# Patient Record
Sex: MALE | Race: WHITE | NOT HISPANIC OR LATINO | Employment: STUDENT | URBAN - NONMETROPOLITAN AREA
[De-identification: names, ages, dates, MRNs, and addresses within clinical notes are randomized per-mention and may not be internally consistent; named-entity substitution may affect disease eponyms.]

---

## 2020-07-04 ENCOUNTER — HOSPITAL ENCOUNTER (INPATIENT)
Facility: HOSPITAL | Age: 20
LOS: 1 days | Discharge: HOME | DRG: 351 | End: 2020-07-06
Attending: EMERGENCY MEDICINE | Admitting: FAMILY MEDICINE
Payer: COMMERCIAL

## 2020-07-04 ENCOUNTER — APPOINTMENT (EMERGENCY)
Dept: CT IMAGING | Facility: HOSPITAL | Age: 20
DRG: 351 | End: 2020-07-04
Payer: COMMERCIAL

## 2020-07-04 ENCOUNTER — APPOINTMENT (EMERGENCY)
Dept: RADIOLOGY | Facility: HOSPITAL | Age: 20
DRG: 351 | End: 2020-07-04
Payer: COMMERCIAL

## 2020-07-04 DIAGNOSIS — R40.4 TRANSIENT ALTERATION OF AWARENESS: ICD-10-CM

## 2020-07-04 DIAGNOSIS — N17.9 AKI (ACUTE KIDNEY INJURY) (HCC): Primary | ICD-10-CM

## 2020-07-04 DIAGNOSIS — F23 ACUTE PSYCHOSIS (HCC): ICD-10-CM

## 2020-07-04 PROBLEM — R74.8 ELEVATED CK: Status: ACTIVE | Noted: 2020-07-04

## 2020-07-04 PROBLEM — E87.2 LACTIC ACIDOSIS: Status: ACTIVE | Noted: 2020-07-04

## 2020-07-04 PROBLEM — S01.21XA LACERATION OF NOSE: Status: ACTIVE | Noted: 2020-07-04

## 2020-07-04 PROBLEM — E87.20 LACTIC ACIDOSIS: Status: ACTIVE | Noted: 2020-07-04

## 2020-07-04 LAB
ALBUMIN SERPL BCP-MCNC: 4.1 G/DL (ref 3.5–5)
ALP SERPL-CCNC: 51 U/L (ref 46–116)
ALT SERPL W P-5'-P-CCNC: 52 U/L (ref 12–78)
AMPHETAMINES SERPL QL SCN: NEGATIVE
ANION GAP SERPL CALCULATED.3IONS-SCNC: 12 MMOL/L (ref 4–13)
AST SERPL W P-5'-P-CCNC: 66 U/L (ref 5–45)
BARBITURATES UR QL: NEGATIVE
BASOPHILS # BLD AUTO: 0.04 THOUSANDS/ΜL (ref 0–0.1)
BASOPHILS NFR BLD AUTO: 1 % (ref 0–1)
BENZODIAZ UR QL: NEGATIVE
BILIRUB SERPL-MCNC: 0.87 MG/DL (ref 0.2–1)
BUN SERPL-MCNC: 18 MG/DL (ref 5–25)
CALCIUM SERPL-MCNC: 9.3 MG/DL (ref 8.3–10.1)
CHLORIDE SERPL-SCNC: 106 MMOL/L (ref 100–108)
CK MB SERPL-MCNC: 1.9 NG/ML (ref 0–5)
CK MB SERPL-MCNC: <1 % (ref 0–2.5)
CK SERPL-CCNC: 921 U/L (ref 39–308)
CO2 SERPL-SCNC: 25 MMOL/L (ref 21–32)
COCAINE UR QL: NEGATIVE
CREAT SERPL-MCNC: 1.8 MG/DL (ref 0.6–1.3)
EOSINOPHIL # BLD AUTO: 0.05 THOUSAND/ΜL (ref 0–0.61)
EOSINOPHIL NFR BLD AUTO: 1 % (ref 0–6)
ERYTHROCYTE [DISTWIDTH] IN BLOOD BY AUTOMATED COUNT: 11.9 % (ref 11.6–15.1)
ETHANOL SERPL-MCNC: <3 MG/DL (ref 0–3)
GFR SERPL CREATININE-BSD FRML MDRD: 53 ML/MIN/1.73SQ M
GLUCOSE SERPL-MCNC: 103 MG/DL (ref 65–140)
HCT VFR BLD AUTO: 41.2 % (ref 36.5–49.3)
HGB BLD-MCNC: 14.1 G/DL (ref 12–17)
IMM GRANULOCYTES # BLD AUTO: 0.03 THOUSAND/UL (ref 0–0.2)
IMM GRANULOCYTES NFR BLD AUTO: 0 % (ref 0–2)
LACTATE SERPL-SCNC: 3 MMOL/L (ref 0.5–2)
LIPASE SERPL-CCNC: 205 U/L (ref 73–393)
LYMPHOCYTES # BLD AUTO: 0.64 THOUSANDS/ΜL (ref 0.6–4.47)
LYMPHOCYTES NFR BLD AUTO: 8 % (ref 14–44)
MAGNESIUM SERPL-MCNC: 2 MG/DL (ref 1.6–2.6)
MCH RBC QN AUTO: 29.3 PG (ref 26.8–34.3)
MCHC RBC AUTO-ENTMCNC: 34.2 G/DL (ref 31.4–37.4)
MCV RBC AUTO: 86 FL (ref 82–98)
METHADONE UR QL: NEGATIVE
MONOCYTES # BLD AUTO: 0.81 THOUSAND/ΜL (ref 0.17–1.22)
MONOCYTES NFR BLD AUTO: 10 % (ref 4–12)
NEUTROPHILS # BLD AUTO: 6.39 THOUSANDS/ΜL (ref 1.85–7.62)
NEUTS SEG NFR BLD AUTO: 80 % (ref 43–75)
NRBC BLD AUTO-RTO: 0 /100 WBCS
OPIATES UR QL SCN: NEGATIVE
OXYCODONE+OXYMORPHONE UR QL SCN: NEGATIVE
PCP UR QL: NEGATIVE
PLATELET # BLD AUTO: 294 THOUSANDS/UL (ref 149–390)
PMV BLD AUTO: 9.5 FL (ref 8.9–12.7)
POTASSIUM SERPL-SCNC: 3.6 MMOL/L (ref 3.5–5.3)
PROT SERPL-MCNC: 7.7 G/DL (ref 6.4–8.2)
RBC # BLD AUTO: 4.81 MILLION/UL (ref 3.88–5.62)
SODIUM SERPL-SCNC: 143 MMOL/L (ref 136–145)
THC UR QL: NEGATIVE
WBC # BLD AUTO: 7.96 THOUSAND/UL (ref 4.31–10.16)

## 2020-07-04 PROCEDURE — 71045 X-RAY EXAM CHEST 1 VIEW: CPT

## 2020-07-04 PROCEDURE — 12011 RPR F/E/E/N/L/M 2.5 CM/<: CPT | Performed by: EMERGENCY MEDICINE

## 2020-07-04 PROCEDURE — 09QKXZZ REPAIR NASAL MUCOSA AND SOFT TISSUE, EXTERNAL APPROACH: ICD-10-PCS | Performed by: EMERGENCY MEDICINE

## 2020-07-04 PROCEDURE — 80307 DRUG TEST PRSMV CHEM ANLYZR: CPT | Performed by: EMERGENCY MEDICINE

## 2020-07-04 PROCEDURE — 85025 COMPLETE CBC W/AUTO DIFF WBC: CPT | Performed by: EMERGENCY MEDICINE

## 2020-07-04 PROCEDURE — 83690 ASSAY OF LIPASE: CPT | Performed by: EMERGENCY MEDICINE

## 2020-07-04 PROCEDURE — 82550 ASSAY OF CK (CPK): CPT | Performed by: EMERGENCY MEDICINE

## 2020-07-04 PROCEDURE — 96360 HYDRATION IV INFUSION INIT: CPT

## 2020-07-04 PROCEDURE — 99285 EMERGENCY DEPT VISIT HI MDM: CPT

## 2020-07-04 PROCEDURE — 36415 COLL VENOUS BLD VENIPUNCTURE: CPT | Performed by: EMERGENCY MEDICINE

## 2020-07-04 PROCEDURE — 83605 ASSAY OF LACTIC ACID: CPT | Performed by: EMERGENCY MEDICINE

## 2020-07-04 PROCEDURE — 80053 COMPREHEN METABOLIC PANEL: CPT | Performed by: EMERGENCY MEDICINE

## 2020-07-04 PROCEDURE — 70450 CT HEAD/BRAIN W/O DYE: CPT

## 2020-07-04 PROCEDURE — 80320 DRUG SCREEN QUANTALCOHOLS: CPT | Performed by: EMERGENCY MEDICINE

## 2020-07-04 PROCEDURE — 99220 PR INITIAL OBSERVATION CARE/DAY 70 MINUTES: CPT | Performed by: NURSE PRACTITIONER

## 2020-07-04 PROCEDURE — 72125 CT NECK SPINE W/O DYE: CPT

## 2020-07-04 PROCEDURE — 99285 EMERGENCY DEPT VISIT HI MDM: CPT | Performed by: EMERGENCY MEDICINE

## 2020-07-04 PROCEDURE — 93005 ELECTROCARDIOGRAM TRACING: CPT

## 2020-07-04 PROCEDURE — 96361 HYDRATE IV INFUSION ADD-ON: CPT

## 2020-07-04 PROCEDURE — 83735 ASSAY OF MAGNESIUM: CPT | Performed by: EMERGENCY MEDICINE

## 2020-07-04 PROCEDURE — 82553 CREATINE MB FRACTION: CPT | Performed by: EMERGENCY MEDICINE

## 2020-07-04 RX ORDER — SODIUM CHLORIDE 9 MG/ML
125 INJECTION, SOLUTION INTRAVENOUS CONTINUOUS
Status: DISCONTINUED | OUTPATIENT
Start: 2020-07-04 | End: 2020-07-06

## 2020-07-04 RX ADMIN — SODIUM CHLORIDE 125 ML/HR: 0.9 INJECTION, SOLUTION INTRAVENOUS at 23:49

## 2020-07-04 RX ADMIN — SODIUM CHLORIDE 1000 ML: 0.9 INJECTION, SOLUTION INTRAVENOUS at 21:33

## 2020-07-05 PROBLEM — F23 ACUTE PSYCHOSIS (HCC): Status: ACTIVE | Noted: 2020-07-04

## 2020-07-05 PROBLEM — T79.6XXA TRAUMATIC RHABDOMYOLYSIS (HCC): Status: ACTIVE | Noted: 2020-07-04

## 2020-07-05 LAB
ANION GAP SERPL CALCULATED.3IONS-SCNC: 10 MMOL/L (ref 4–13)
ANION GAP SERPL CALCULATED.3IONS-SCNC: 11 MMOL/L (ref 4–13)
ANION GAP SERPL CALCULATED.3IONS-SCNC: 8 MMOL/L (ref 4–13)
BACTERIA UR QL AUTO: ABNORMAL /HPF
BASOPHILS # BLD AUTO: 0.02 THOUSANDS/ΜL (ref 0–0.1)
BASOPHILS NFR BLD AUTO: 0 % (ref 0–1)
BILIRUB UR QL STRIP: ABNORMAL
BUN SERPL-MCNC: 10 MG/DL (ref 5–25)
BUN SERPL-MCNC: 11 MG/DL (ref 5–25)
BUN SERPL-MCNC: 8 MG/DL (ref 5–25)
CALCIUM SERPL-MCNC: 5.1 MG/DL (ref 8.3–10.1)
CALCIUM SERPL-MCNC: 6.3 MG/DL (ref 8.3–10.1)
CALCIUM SERPL-MCNC: 8.3 MG/DL (ref 8.3–10.1)
CAOX CRY URNS QL MICRO: ABNORMAL /HPF
CHLORIDE SERPL-SCNC: 106 MMOL/L (ref 100–108)
CHLORIDE SERPL-SCNC: 113 MMOL/L (ref 100–108)
CHLORIDE SERPL-SCNC: 117 MMOL/L (ref 100–108)
CK MB SERPL-MCNC: 0.9 NG/ML (ref 0–5)
CK MB SERPL-MCNC: 1.2 NG/ML (ref 0–5)
CK MB SERPL-MCNC: <1 % (ref 0–2.5)
CK MB SERPL-MCNC: <1 % (ref 0–2.5)
CK SERPL-CCNC: 455 U/L (ref 39–308)
CK SERPL-CCNC: 578 U/L (ref 39–308)
CLARITY UR: ABNORMAL
CO2 SERPL-SCNC: 19 MMOL/L (ref 21–32)
CO2 SERPL-SCNC: 22 MMOL/L (ref 21–32)
CO2 SERPL-SCNC: 26 MMOL/L (ref 21–32)
COLOR UR: YELLOW
CREAT SERPL-MCNC: 0.76 MG/DL (ref 0.6–1.3)
CREAT SERPL-MCNC: 1.07 MG/DL (ref 0.6–1.3)
CREAT SERPL-MCNC: 1.38 MG/DL (ref 0.6–1.3)
EOSINOPHIL # BLD AUTO: 0.14 THOUSAND/ΜL (ref 0–0.61)
EOSINOPHIL NFR BLD AUTO: 3 % (ref 0–6)
ERYTHROCYTE [DISTWIDTH] IN BLOOD BY AUTOMATED COUNT: 12.1 % (ref 11.6–15.1)
GFR SERPL CREATININE-BSD FRML MDRD: 130 ML/MIN/1.73SQ M
GFR SERPL CREATININE-BSD FRML MDRD: 73 ML/MIN/1.73SQ M
GFR SERPL CREATININE-BSD FRML MDRD: 99 ML/MIN/1.73SQ M
GLUCOSE P FAST SERPL-MCNC: 88 MG/DL (ref 65–99)
GLUCOSE SERPL-MCNC: 106 MG/DL (ref 65–140)
GLUCOSE SERPL-MCNC: 88 MG/DL (ref 65–140)
GLUCOSE SERPL-MCNC: 95 MG/DL (ref 65–140)
GLUCOSE UR STRIP-MCNC: NEGATIVE MG/DL
HCT VFR BLD AUTO: 30.3 % (ref 36.5–49.3)
HGB BLD-MCNC: 10.1 G/DL (ref 12–17)
HGB UR QL STRIP.AUTO: NEGATIVE
IMM GRANULOCYTES # BLD AUTO: 0.01 THOUSAND/UL (ref 0–0.2)
IMM GRANULOCYTES NFR BLD AUTO: 0 % (ref 0–2)
KETONES UR STRIP-MCNC: ABNORMAL MG/DL
LACTATE SERPL-SCNC: 0.6 MMOL/L (ref 0.5–2)
LEUKOCYTE ESTERASE UR QL STRIP: NEGATIVE
LYMPHOCYTES # BLD AUTO: 0.87 THOUSANDS/ΜL (ref 0.6–4.47)
LYMPHOCYTES NFR BLD AUTO: 18 % (ref 14–44)
MCH RBC QN AUTO: 29.5 PG (ref 26.8–34.3)
MCHC RBC AUTO-ENTMCNC: 33.3 G/DL (ref 31.4–37.4)
MCV RBC AUTO: 89 FL (ref 82–98)
MONOCYTES # BLD AUTO: 0.5 THOUSAND/ΜL (ref 0.17–1.22)
MONOCYTES NFR BLD AUTO: 11 % (ref 4–12)
NEUTROPHILS # BLD AUTO: 3.23 THOUSANDS/ΜL (ref 1.85–7.62)
NEUTS SEG NFR BLD AUTO: 68 % (ref 43–75)
NITRITE UR QL STRIP: NEGATIVE
NON-SQ EPI CELLS URNS QL MICRO: ABNORMAL /HPF
NRBC BLD AUTO-RTO: 0 /100 WBCS
PH UR STRIP.AUTO: 6.5 [PH]
PLATELET # BLD AUTO: 195 THOUSANDS/UL (ref 149–390)
PMV BLD AUTO: 9.4 FL (ref 8.9–12.7)
POTASSIUM SERPL-SCNC: 2.3 MMOL/L (ref 3.5–5.3)
POTASSIUM SERPL-SCNC: 2.8 MMOL/L (ref 3.5–5.3)
POTASSIUM SERPL-SCNC: 4 MMOL/L (ref 3.5–5.3)
PROT UR STRIP-MCNC: ABNORMAL MG/DL
RBC # BLD AUTO: 3.42 MILLION/UL (ref 3.88–5.62)
RBC #/AREA URNS AUTO: ABNORMAL /HPF
SODIUM SERPL-SCNC: 140 MMOL/L (ref 136–145)
SODIUM SERPL-SCNC: 145 MMOL/L (ref 136–145)
SODIUM SERPL-SCNC: 147 MMOL/L (ref 136–145)
SP GR UR STRIP.AUTO: >=1.03 (ref 1–1.03)
TSH SERPL DL<=0.05 MIU/L-ACNC: 3.84 UIU/ML (ref 0.36–3.74)
UROBILINOGEN UR QL STRIP.AUTO: 1 E.U./DL
WBC # BLD AUTO: 4.77 THOUSAND/UL (ref 4.31–10.16)
WBC #/AREA URNS AUTO: ABNORMAL /HPF

## 2020-07-05 PROCEDURE — G0425 INPT/ED TELECONSULT30: HCPCS | Performed by: PSYCHIATRY & NEUROLOGY

## 2020-07-05 PROCEDURE — 82550 ASSAY OF CK (CPK): CPT | Performed by: NURSE PRACTITIONER

## 2020-07-05 PROCEDURE — 84443 ASSAY THYROID STIM HORMONE: CPT | Performed by: NURSE PRACTITIONER

## 2020-07-05 PROCEDURE — 82553 CREATINE MB FRACTION: CPT | Performed by: FAMILY MEDICINE

## 2020-07-05 PROCEDURE — 80048 BASIC METABOLIC PNL TOTAL CA: CPT | Performed by: FAMILY MEDICINE

## 2020-07-05 PROCEDURE — 85025 COMPLETE CBC W/AUTO DIFF WBC: CPT | Performed by: NURSE PRACTITIONER

## 2020-07-05 PROCEDURE — 80048 BASIC METABOLIC PNL TOTAL CA: CPT | Performed by: NURSE PRACTITIONER

## 2020-07-05 PROCEDURE — 99234 HOSP IP/OBS SM DT SF/LOW 45: CPT | Performed by: FAMILY MEDICINE

## 2020-07-05 PROCEDURE — 82550 ASSAY OF CK (CPK): CPT | Performed by: FAMILY MEDICINE

## 2020-07-05 PROCEDURE — 83605 ASSAY OF LACTIC ACID: CPT | Performed by: NURSE PRACTITIONER

## 2020-07-05 PROCEDURE — 82553 CREATINE MB FRACTION: CPT | Performed by: NURSE PRACTITIONER

## 2020-07-05 RX ORDER — POTASSIUM CHLORIDE 20 MEQ/1
40 TABLET, EXTENDED RELEASE ORAL ONCE
Status: COMPLETED | OUTPATIENT
Start: 2020-07-05 | End: 2020-07-05

## 2020-07-05 RX ORDER — POTASSIUM CHLORIDE 14.9 MG/ML
20 INJECTION INTRAVENOUS ONCE
Status: COMPLETED | OUTPATIENT
Start: 2020-07-05 | End: 2020-07-05

## 2020-07-05 RX ORDER — OLANZAPINE 10 MG/1
10 TABLET, ORALLY DISINTEGRATING ORAL ONCE
Status: COMPLETED | OUTPATIENT
Start: 2020-07-05 | End: 2020-07-05

## 2020-07-05 RX ORDER — ONDANSETRON 2 MG/ML
4 INJECTION INTRAMUSCULAR; INTRAVENOUS EVERY 6 HOURS PRN
Status: DISCONTINUED | OUTPATIENT
Start: 2020-07-05 | End: 2020-07-06 | Stop reason: HOSPADM

## 2020-07-05 RX ORDER — CALCIUM CHLORIDE 100 MG/ML
1 INJECTION INTRAVENOUS; INTRAVENTRICULAR ONCE
Status: COMPLETED | OUTPATIENT
Start: 2020-07-05 | End: 2020-07-05

## 2020-07-05 RX ORDER — LORAZEPAM 2 MG/ML
0.5 INJECTION INTRAMUSCULAR EVERY 6 HOURS PRN
Status: DISCONTINUED | OUTPATIENT
Start: 2020-07-05 | End: 2020-07-05

## 2020-07-05 RX ORDER — OLANZAPINE 2.5 MG/1
10 TABLET ORAL 2 TIMES DAILY PRN
Status: DISCONTINUED | OUTPATIENT
Start: 2020-07-05 | End: 2020-07-06 | Stop reason: HOSPADM

## 2020-07-05 RX ORDER — KETOROLAC TROMETHAMINE 30 MG/ML
15 INJECTION, SOLUTION INTRAMUSCULAR; INTRAVENOUS EVERY 6 HOURS PRN
Status: DISCONTINUED | OUTPATIENT
Start: 2020-07-05 | End: 2020-07-06 | Stop reason: HOSPADM

## 2020-07-05 RX ORDER — LORAZEPAM 2 MG/ML
2 INJECTION INTRAMUSCULAR EVERY 6 HOURS PRN
Status: DISCONTINUED | OUTPATIENT
Start: 2020-07-05 | End: 2020-07-06 | Stop reason: HOSPADM

## 2020-07-05 RX ORDER — OLANZAPINE 10 MG/1
10 INJECTION, POWDER, LYOPHILIZED, FOR SOLUTION INTRAMUSCULAR 2 TIMES DAILY PRN
Status: DISCONTINUED | OUTPATIENT
Start: 2020-07-05 | End: 2020-07-06 | Stop reason: HOSPADM

## 2020-07-05 RX ADMIN — KETOROLAC TROMETHAMINE 15 MG: 30 INJECTION, SOLUTION INTRAMUSCULAR at 15:14

## 2020-07-05 RX ADMIN — SODIUM CHLORIDE 125 ML/HR: 0.9 INJECTION, SOLUTION INTRAVENOUS at 07:22

## 2020-07-05 RX ADMIN — LORAZEPAM 0.5 MG: 2 INJECTION INTRAMUSCULAR; INTRAVENOUS at 15:45

## 2020-07-05 RX ADMIN — POTASSIUM CHLORIDE 40 MEQ: 1500 TABLET, EXTENDED RELEASE ORAL at 19:08

## 2020-07-05 RX ADMIN — SODIUM CHLORIDE 125 ML/HR: 0.9 INJECTION, SOLUTION INTRAVENOUS at 15:45

## 2020-07-05 RX ADMIN — CALCIUM CHLORIDE 1 G: 100 INJECTION, SOLUTION INTRAVENOUS; INTRAVENTRICULAR at 19:08

## 2020-07-05 RX ADMIN — OLANZAPINE 10 MG: 10 TABLET, ORALLY DISINTEGRATING ORAL at 02:15

## 2020-07-05 RX ADMIN — POTASSIUM CHLORIDE 40 MEQ: 1500 TABLET, EXTENDED RELEASE ORAL at 11:59

## 2020-07-05 RX ADMIN — POTASSIUM CHLORIDE 20 MEQ: 14.9 INJECTION, SOLUTION INTRAVENOUS at 11:59

## 2020-07-05 NOTE — ASSESSMENT & PLAN NOTE
· Patient has had occurrences of agitated behavior over past 4 months  · Yesterday he was found naked and agitated in the rafters of a barn  · He was tazed five times, and fell a distance of about two and a half feet striking his head  · CT head:  Right anterior frontal scalp soft tissue swelling without evidence for acute intracranial abnormality   · CT C-spine: No cervical spine fracture or traumatic malalignment  · CXR:  No acute cardiopulmonary process  · UDS and medical alcohol were negative    Patient is awake and alert now  He does not remember the episode that happened yesterday at all  He denies any illicit substance abuse  He has no prior psychiatric history by for last 3 months he has been having some episodes of agitation  Will ask Psychiatry for evaluation

## 2020-07-05 NOTE — QUICK NOTE
Prolonged Care Note    This note summarizes time spent with Kyler Neighbours and time spent on the unit/floor in direct care of Kyler Neighbours which is in addition to the original billed E/M service for this date  Additional care provided:  Evaluation and management of acute agitation    Start Time:  0140      Stop Time:  0229    Total Cumulative Time: 52 minutes    Destinee Melendez    Was called by nursing for patient refusing labs and crying and asking to speak with provider  Patient now hypervigilant and directly staring at staff  Patient asked if he could walk around  When encouraged to stay in bed another few hours until the morning, patient abruptly lunged toward staff  Offered patient anxiety medication to calm him down to help with sleep  He refused  He says he is not staying here  He has required several reiterations of tonight's events and why he is here  Will utilize Zyprexa Zydis 10 mg orally in attempt to avoid escalation  Will hold off on repeat lactate and neuro checks at this point, notified nursing  Asked nursing to decrease environmental stimuli around patient    Will continue to monitor

## 2020-07-05 NOTE — ED PROVIDER NOTES
History  Chief Complaint   Patient presents with    Altered Mental Status     pt  lefrt home and upon returning 30 min later he was found to have excited delirium, running naked in attic, police were called to scene, pt  was uncooperative, unwuillihng to come down from rafters, "fake" shooting at officers with his finger, pt  was tazed once in the chest and in the back for a few seconds in order to get patient under control, upon EMS arrival pt  was found more cooperative, calm, lac to bridge of nose, multiple scratches, tremors, over heated, denies SI or HI, denies drug use     Patient had an argument with his father, left the house, following this returned and seemed altered and agitated  He was running around naked in the [place] is of the home, agitated and refusing to cooperate  Police were called and were required to bring the patient under control  The police were forced to use a Taser to sedate the patient  The patient did fall approximately 2-3 feet from the rafters onto a wood floor, striking his head  Currently the patient has no complaint  He denies any drug or alcohol use at this time  He states he is not sure exactly what happened  History provided by:  Patient, police and EMS personnel   used: No    Altered Mental Status   Presenting symptoms: combativeness and confusion    Severity:  Severe  Most recent episode: Today  Episode history:  Single  Timing:  Constant  Progression:  Resolved  Chronicity:  New  Context: head injury    Context: not alcohol use and not drug use    Associated symptoms: agitation    Associated symptoms: no abdominal pain, no difficulty breathing, no eye deviation, no fever, no hallucinations, no headaches, no light-headedness, no nausea, no palpitations, no rash, no seizures, no slurred speech, no visual change, no vomiting and no weakness        None       History reviewed  No pertinent past medical history  History reviewed   No pertinent surgical history  History reviewed  No pertinent family history  I have reviewed and agree with the history as documented  E-Cigarette/Vaping    E-Cigarette Use Never User      E-Cigarette/Vaping Substances    Nicotine No     THC No     CBD No     Flavoring No      Social History     Tobacco Use    Smoking status: Never Smoker    Smokeless tobacco: Never Used   Substance Use Topics    Alcohol use: Yes     Frequency: Monthly or less     Drinks per session: 1 or 2    Drug use: Never       Review of Systems   Constitutional: Negative for chills and fever  HENT: Negative for ear pain, hearing loss, sore throat, trouble swallowing and voice change  Eyes: Negative for pain and discharge  Respiratory: Negative for cough, shortness of breath and wheezing  Cardiovascular: Negative for chest pain and palpitations  Gastrointestinal: Negative for abdominal pain, blood in stool, constipation, diarrhea, nausea and vomiting  Genitourinary: Negative for dysuria, flank pain, frequency and hematuria  Musculoskeletal: Negative for joint swelling, neck pain and neck stiffness  Skin: Negative for rash and wound  Neurological: Negative for dizziness, seizures, syncope, facial asymmetry, weakness, light-headedness and headaches  Psychiatric/Behavioral: Positive for agitation and confusion  Negative for hallucinations, self-injury and suicidal ideas  All other systems reviewed and are negative  Physical Exam  Physical Exam   Constitutional: He is oriented to person, place, and time  He appears well-developed and well-nourished  No distress  HENT:   Head: Normocephalic and atraumatic  Right Ear: External ear normal    Left Ear: External ear normal    Superficial laceration of the nasal bridge noted   Eyes: Pupils are equal, round, and reactive to light  Conjunctivae and EOM are normal    Neck: Normal range of motion  Neck supple     Cardiovascular: Normal rate, regular rhythm and normal heart sounds  No murmur heard  Pulmonary/Chest: Effort normal and breath sounds normal  He has no wheezes  He has no rales  Abdominal: Soft  Bowel sounds are normal  He exhibits no distension  There is no tenderness  There is no rebound and no guarding  Musculoskeletal: Normal range of motion  He exhibits no deformity  Neurological: He is alert and oriented to person, place, and time  No cranial nerve deficit  Skin: Skin is warm and dry  No rash noted  Psychiatric: He has a normal mood and affect  His behavior is normal    Nursing note and vitals reviewed  Vital Signs  ED Triage Vitals [07/04/20 2113]   Temperature Pulse Respirations Blood Pressure SpO2   99 9 °F (37 7 °C) 99 20 153/81 98 %      Temp Source Heart Rate Source Patient Position - Orthostatic VS BP Location FiO2 (%)   Temporal Monitor Lying Left arm --      Pain Score       1           Vitals:    07/04/20 2113 07/04/20 2228 07/04/20 2319   BP: 153/81 138/67 136/75   Pulse: 99 85 100   Patient Position - Orthostatic VS: Lying Lying Lying         Visual Acuity  Visual Acuity      Most Recent Value   L Pupil Size (mm)  2   R Pupil Size (mm)  2          ED Medications  Medications   sodium chloride 0 9 % infusion (has no administration in time range)   sodium chloride 0 9 % bolus 1,000 mL (0 mL Intravenous Stopped 7/4/20 2330)       Diagnostic Studies  Results Reviewed     Procedure Component Value Units Date/Time    Rapid drug screen, urine [[id number]]  (Normal) Collected:  07/04/20 2245    Lab Status:  Final result Specimen:  Urine, Clean Catch Updated:  07/04/20 2306     Amph/Meth UR Negative     Barbiturate Ur Negative     Benzodiazepine Urine Negative     Cocaine Urine Negative     Methadone Urine Negative     Opiate Urine Negative     PCP Ur Negative     THC Urine Negative     Oxycodone Urine Negative    Narrative:       FOR MEDICAL PURPOSES ONLY  IF CONFIRMATION NEEDED PLEASE CONTACT THE LAB WITHIN 5 DAYS      Drug Screen Cutoff Levels:  AMPHETAMINE/METHAMPHETAMINES  1000 ng/mL  BARBITURATES     200 ng/mL  BENZODIAZEPINES     200 ng/mL  COCAINE      300 ng/mL  METHADONE      300 ng/mL  OPIATES      300 ng/mL  PHENCYCLIDINE     25 ng/mL  THC       50 ng/mL  OXYCODONE      100 ng/mL    CKMB [[id number]]  (Normal) Collected:  07/04/20 2131    Lab Status:  Final result Specimen:  Blood from Hand, Right Updated:  07/04/20 2220     CK-MB Index <1 0 %      CK-MB 1 9 ng/mL     Lactic acid, plasma [[id number]]  (Abnormal) Collected:  07/04/20 2131    Lab Status:  Final result Specimen:  Blood from Arm, Right Updated:  07/04/20 2202     LACTIC ACID 3 0 mmol/L     Narrative:       Result may be elevated if tourniquet was used during collection      Lactic acid 2 Hours [[id number]]     Lab Status:  No result Specimen:  Blood     Comprehensive metabolic panel [[id number]]  (Abnormal) Collected:  07/04/20 2131    Lab Status:  Final result Specimen:  Blood from Hand, Right Updated:  07/04/20 2201     Sodium 143 mmol/L      Potassium 3 6 mmol/L      Chloride 106 mmol/L      CO2 25 mmol/L      ANION GAP 12 mmol/L      BUN 18 mg/dL      Creatinine 1 80 mg/dL      Glucose 103 mg/dL      Calcium 9 3 mg/dL      AST 66 U/L      ALT 52 U/L      Alkaline Phosphatase 51 U/L      Total Protein 7 7 g/dL      Albumin 4 1 g/dL      Total Bilirubin 0 87 mg/dL      eGFR 53 ml/min/1 73sq m     Narrative:       [facility] guidelines for Chronic Kidney Disease (CKD):     Stage 1 with normal or high GFR (GFR > 90 mL/min/1 73 square meters)    Stage 2 Mild CKD (GFR = 60-89 mL/min/1 73 square meters)    Stage 3A Moderate CKD (GFR = 45-59 mL/min/1 73 square meters)    Stage 3B Moderate CKD (GFR = 30-44 mL/min/1 73 square meters)    Stage 4 Severe CKD (GFR = 15-29 mL/min/1 73 square meters)    Stage 5 End Stage CKD (GFR <15 mL/min/1 73 square meters)  Note: GFR calculation is accurate only with a steady state creatinine    Lipase [[id number]]  (Normal) Collected:  07/04/20 2131    Lab Status:  Final result Specimen:  Blood from Hand, Right Updated:  07/04/20 2201     Lipase 205 u/L     Magnesium [[id number]]  (Normal) Collected:  07/04/20 2131    Lab Status:  Final result Specimen:  Blood from Hand, Right Updated:  07/04/20 2201     Magnesium 2 0 mg/dL     CK (with reflex to MB) [[id number]]  (Abnormal) Collected:  07/04/20 2131    Lab Status:  Final result Specimen:  Blood from Hand, Right Updated:  07/04/20 2158     Total  U/L     Ethanol [[id number]]  (Normal) Collected:  07/04/20 2131    Lab Status:  Final result Specimen:  Blood from Arm, Right Updated:  07/04/20 2156     Ethanol Lvl <3 mg/dL     CBC and differential [[id number]]  (Abnormal) Collected:  07/04/20 2131    Lab Status:  Final result Specimen:  Blood from Hand, Right Updated:  07/04/20 2138     WBC 7 96 Thousand/uL      RBC 4 81 Million/uL      Hemoglobin 14 1 g/dL      Hematocrit 41 2 %      MCV 86 fL      MCH 29 3 pg      MCHC 34 2 g/dL      RDW 11 9 %      MPV 9 5 fL      Platelets 516 Thousands/uL      nRBC 0 /100 WBCs      Neutrophils Relative 80 %      Immat GRANS % 0 %      Lymphocytes Relative 8 %      Monocytes Relative 10 %      Eosinophils Relative 1 %      Basophils Relative 1 %      Neutrophils Absolute 6 39 Thousands/µL      Immature Grans Absolute 0 03 Thousand/uL      Lymphocytes Absolute 0 64 Thousands/µL      Monocytes Absolute 0 81 Thousand/µL      Eosinophils Absolute 0 05 Thousand/µL      Basophils Absolute 0 04 Thousands/µL                  CT spine cervical wo contrast   Final Result by Razia Salinas MD (07/04 2217)      No cervical spine fracture or traumatic malalignment  Workstation performed: [id number]         CT head wo contrast   Final Result by Razia Salinas MD (07/04 2219)         1  Right anterior frontal scalp soft tissue swelling without evidence for acute intracranial abnormality                    Workstation performed: [id number] XR chest portable    (Results Pending)              Procedures  ECG 12 Lead Documentation Only  Date/Time: 7/4/2020 9:17 PM  Performed by: Tyrone Bull MD  Authorized by: Tyrone Bull MD     ECG reviewed by me, the ED Provider: yes    Patient location:  ED  Previous ECG:     Previous ECG:  Unavailable  Interpretation:     Interpretation: abnormal    Rate:     ECG rate:  92    ECG rate assessment: normal    Rhythm:     Rhythm: sinus rhythm    Ectopy:     Ectopy: none    QRS:     QRS axis:  Right    QRS intervals:  Normal  Conduction:     Conduction: normal    ST segments:     ST segments:  Normal  T waves:     T waves: inverted      Inverted:  III and aVF    Laceration repair  Date/Time: 7/4/2020 11:44 PM  Performed by: Tyrone Bull MD  Authorized by: Tyrone Bull MD   Consent: Verbal consent obtained  Written consent not obtained  Risks and benefits: risks, benefits and alternatives were discussed  Consent given by: patient  Patient understanding: patient states understanding of the procedure being performed  Patient identity confirmed: verbally with patient and arm band  Body area: head/neck  Location details: nose  Laceration length: 1 cm  Anesthesia: local infiltration    Anesthesia:  Local Anesthetic: lidocaine 1% with epinephrine      Procedure Details:  Preparation: Patient was prepped and draped in the usual sterile fashion  Irrigation solution: saline  Amount of cleaning: standard  Debridement: none  Degree of undermining: none  Wound skin closure material used: 6-0 vicryl, 5-0 vicryl    Number of sutures: 7  Technique: simple  Approximation: close  Approximation difficulty: simple  Patient tolerance: Patient tolerated the procedure well with no immediate complications  Comments: 2 lacerations closed with total of 7 sutures as noted above  One is linear, 1 cm  The other is stellate 1 cm                 ED Course       US AUDIT      Most Recent Value   Initial Alcohol Screen: US AUDIT-C 1  How often do you have a drink containing alcohol? 1 Filed at: 07/04/2020 2117   2  How many drinks containing alcohol do you have on a typical day you are drinking? 1 Filed at: 07/04/2020 2117   Audit-C Score  2 Filed at: 07/04/2020 2117                  LAINE/DAST-10      Most Recent Value   How many times in the past year have you    Used an illegal drug or used a prescription medication for non-medical reasons? Never Filed at: 07/04/2020 2117                                MDM  Number of Diagnoses or Management Options     Amount and/or Complexity of Data Reviewed  Clinical lab tests: ordered and reviewed  Tests in the radiology section of CPT®: ordered and reviewed  Decide to obtain previous medical records or to obtain history from someone other than the patient: yes  Obtain history from someone other than the patient: yes  Review and summarize past medical records: yes  Independent visualization of images, tracings, or specimens: yes          Disposition  Final diagnoses:   POLINA (acute kidney injury) ([facility] [address] )     Time reflects when diagnosis was documented in both MDM as applicable and the Disposition within this note     Time User Action Codes Description Comment    7/4/2020 11:43 PM Kiara Blandau Add [N17 9] POLINA (acute kidney injury) [facility])       ED Disposition     ED Disposition Condition Date/Time Comment    Admit Stable Sat Jul 4, 2020 11:43 PM Case was discussed with Domingo Galindo and the patient's admission status was agreed to be Admission Status: observation status to the service of Dr Goyal Friend   Follow-up Information    None         Patient's Medications    No medications on file     No discharge procedures on file      PDMP Review     None          ED Provider  Electronically Signed by           Garrett Lilly MD  07/04/20 6940

## 2020-07-05 NOTE — PROGRESS NOTES
Progress Note - Howard Riding 2000, 23 y o  male MRN: [id number]    Unit/Bed#: MS [facility] Encounter: [id number]    Primary Care Provider: Obdulia Gamboa MD   Date and time admitted to hospital: 7/4/2020  9:11 PM        * Acute psychosis ([facility] )  Assessment & Plan  · Patient has had occurrences of agitated behavior over past 4 months  · Yesterday he was found naked and agitated in the rafters of a barn  · He was tazed five times, and fell a distance of about two and a half feet striking his head  · CT head:  Right anterior frontal scalp soft tissue swelling without evidence for acute intracranial abnormality   · CT C-spine: No cervical spine fracture or traumatic malalignment  · CXR:  No acute cardiopulmonary process  · UDS and medical alcohol were negative    Patient is awake and alert now  He does not remember the episode that happened yesterday at all  He denies any illicit substance abuse  He has no prior psychiatric history by for last 3 months he has been having some episodes of agitation  Will ask Psychiatry for evaluation  Laceration of nose  Assessment & Plan  · Patient sustained laceration which was repaired by ER physician  · Could consider a plastic surgery consult as outpatient    Lactic acidosis  Assessment & Plan  · Lactic acid 3 on admission  · Received IV hydration and lactic acidosis has now resolved  Traumatic rhabdomyolysis (HCC)  Assessment & Plan  · CK:  921 on admission  Patient is very agitated and was Tazed several times by  and tried to be restrained    · Received 1 L normal saline  · Continue normal saline at 125 mL/hr  · Urine drug screen is negative and patient denies any illicit substance abuse  · Recheck CK again tomorrow and continue hydration to prevent rhabdomyolysis from developing    POLINA (acute kidney injury) ([facility] )  Assessment & Plan  · Creatinine 1 8  · Received normal saline 1 L  · Continue normal saline at 125mL/hr  · Recheck metabolic panel and trend creatinine  · Avoid hypotension and nephrotoxins  · Repeat creatinine is 1 07 and hence acute kidney injury has resolved      VTE Pharmacologic Prophylaxis:   Pharmacologic: Pharmacologic VTE Prophylaxis contraindicated due to Low risk  Mechanical VTE Prophylaxis in Place: Yes    Patient Centered Rounds: I have performed bedside rounds with nursing staff today  Discussions with Specialists or Other Care Team Provider:  None  Will discuss with psych later today    Education and Discussions with Family / Patient:  Discussed with family at bedside    Time Spent for Care: 30 minutes  More than 50% of total time spent on counseling and coordination of care as described above  Current Length of Stay: 0 day(s)    Current Patient Status:  Inpatient  Certification Statement: The patient will continue to require additional inpatient hospital stay due to Acute psychosis    Discharge Plan:  Disposition dependent upon behavioral health evaluation    Code Status: Level 1 - Full Code      Subjective:   Patient currently denies any memory of what happened yesterday  He states his body feels sore  He denies any drug use or any relationship issues  He states he has never had any issues with hearing voices or any depression or suicidal ideation or homicidal ideation  He states he has no mental health history  Currently he is calm and cooperative at this time    Objective:     Vitals:   Temp (24hrs), Av 3 °F (37 4 °C), Min:98 4 °F (36 9 °C), Max:99 9 °F (37 7 °C)    Temp:  [98 4 °F (36 9 °C)-99 9 °F (37 7 °C)] 98 4 °F (36 9 °C)  HR:  [] 73  Resp:  [15-20] 16  BP: (109-153)/(67-81) 119/74  SpO2:  [98 %-100 %] 100 %  Body mass index is 27 82 kg/m²  Input and Output Summary (last 24 hours):        Intake/Output Summary (Last 24 hours) at 2020 1352  Last data filed at 2020 1224  Gross per 24 hour   Intake 220 ml   Output    Net 220 ml       Physical Exam:     Physical Exam   Constitutional: He is oriented to person, place, and time  He appears well-developed and well-nourished  HENT:   Head: Normocephalic and atraumatic  Right Ear: External ear normal    Left Ear: External ear normal    Mouth/Throat: Oropharynx is clear and moist    Bruising noted over the nose and the face with dressing over the nose   Eyes: Pupils are equal, round, and reactive to light  Conjunctivae and EOM are normal    Neck: Normal range of motion  Neck supple  Cardiovascular: Normal rate, regular rhythm, normal heart sounds and intact distal pulses  Pulmonary/Chest: Effort normal and breath sounds normal    Abdominal: Soft  Bowel sounds are normal  He exhibits no mass  There is no tenderness  There is no rebound and no guarding  Genitourinary:   Genitourinary Comments: deferred   Musculoskeletal: Normal range of motion  Neurological: He is alert and oriented to person, place, and time  He has normal reflexes  Cranial nerves 2-12 are normal   Normal neurological exam   Skin: Skin is warm and dry  No rash noted  Psychiatric:   Quiet and withdrawn   Nursing note and vitals reviewed  Additional Data:     Labs:    Results from last 7 days   Lab Units 07/05/20  0726   WBC Thousand/uL 4 77   HEMOGLOBIN g/dL 10 1*   HEMATOCRIT % 30 3*   PLATELETS Thousands/uL 195   NEUTROS PCT % 68   LYMPHS PCT % 18   MONOS PCT % 11   EOS PCT % 3     Results from last 7 days   Lab Units 07/05/20  0736 07/04/20  2131   SODIUM mmol/L 145 143   POTASSIUM mmol/L 2 8* 3 6   CHLORIDE mmol/L 113* 106   CO2 mmol/L 22 25   BUN mg/dL 11 18   CREATININE mg/dL 1 07 1 80*   ANION GAP mmol/L 10 12   CALCIUM mg/dL 6 3* 9 3   ALBUMIN g/dL  --  4 1   TOTAL BILIRUBIN mg/dL  --  0 87   ALK PHOS U/L  --  51   ALT U/L  --  52   AST U/L  --  66*   GLUCOSE RANDOM mg/dL 88 103                 Results from last 7 days   Lab Units 07/05/20  0735 07/04/20  2131   LACTIC ACID mmol/L 0 6 3 0*           * I Have Reviewed All Lab Data Listed Above    * Additional Pertinent Lab Tests Reviewed: [facility] Admission Reviewed    Imaging:    Imaging Reports Reviewed Today Include:  ct spine CT head chest x-ray  Imaging Personally Reviewed by Myself Includes:  None    Recent Cultures (last 7 days):           Last 24 Hours Medication List:     Current Facility-Administered Medications:  ondansetron 4 mg Intravenous Q6H PRN CHANDLER Stahl    potassium chloride 20 mEq Intravenous Once Lorenzo Hernandez MD Last Rate: 20 mEq (07/05/20 1159)   sodium chloride 125 mL/hr Intravenous Continuous Mony Erazo MD Last Rate: 125 mL/hr (07/05/20 6927)        Today, Patient Was Seen By: Lorenzo Hernandez MD    ** Please Note: Dictation voice to text software may have been used in the creation of this document   **

## 2020-07-05 NOTE — ASSESSMENT & PLAN NOTE
· CK:  921 on admission  Patient is very agitated and was Tazed several times by  and tried to be restrained    · Received 1 L normal saline  · Continue normal saline at 125 mL/hr  · Urine drug screen is negative and patient denies any illicit substance abuse  · Recheck CK again tomorrow and continue hydration to prevent rhabdomyolysis from developing

## 2020-07-05 NOTE — H&P
[facility]    H&P- Shaquille  2000, 23 y o  male MRN: [id number]    Unit/Bed#: -Jose Encounter: [id number]    Primary Care Provider: Khadijah Marroquin MD   Date and time admitted to hospital: 7/4/2020  9:11 PM        * Transient alteration of awareness  Assessment & Plan  · Patient has had occurrences of agitated behavior over past 4 months  · Tonight he was found naked and agitated in the rafters of a barn  · He was tazed five times, and fell a distance of about two and a half feet striking his head  · CT head:  Right anterior frontal scalp soft tissue swelling without evidence for acute intracranial abnormality   · CT C-spine: No cervical spine fracture or traumatic malalignment  · CXR:  No acute cardiopulmonary process  · UDS and medical alcohol were negative  · Observation status  · Neuro checks  · Psychiatric consult  · Check TSH    POLINA (acute kidney injury) ([facility] [address] )  Assessment & Plan  · Creatinine 1 8  · Received normal saline 1 L  · Continue normal saline at 125mL/hr  · Recheck metabolic panel and trend creatinine  · Avoid hypotension and nephrotoxins    Elevated CK  Assessment & Plan  · CK:  921  · Received 1 L normal saline  · Continue normal saline at 125 mL/hr  · Recheck CK  · Check urinalysis    Laceration of nose  Assessment & Plan  · Patient sustained laceration which was repaired by ER physician  · Could consider a plastic surgery consult as outpatient    Lactic acidosis  Assessment & Plan  · Lactic acid 3  · Received 1 L normal saline  · Recheck lactic acid      VTE Prophylaxis: Pharmacologic VTE Prophylaxis contraindicated due to VTE score 1  / reason for no mechanical VTE prophylaxis VTE score 1   Code Status:  Full  POLST: POLST is not applicable to this patient  Discussion with family:  Patient    Anticipated Length of Stay:  Patient will be admitted on an Observation basis with an anticipated length of stay of  less than 2 midnights  Justification for Hospital Stay:  Per plan above    Total Time for Visit, including Counseling / Coordination of Care: 45 minutes  Greater than 50% of this total time spent on direct patient counseling and coordination of care  Chief Complaint:   Altered mental status    History of Present Illness:    Roslyn Bingham is a 23 y o  male with no significant medical history who presents with altered mental status  Per patient's family, he has been having occasional periods of agitation and altered mental status  Today, he had left home for a little while, and when he came back, he was found naked and in the rafters of his barn  It required several police officers and five instances of tazing to subdue patient enough to allow him to be brought to the hospital for medical evaluation  Patient denies drug or alcohol use  He has no known psychiatric history  Patient denies suicidal or homicidal ideation  CT of head and neck were negative  UDS and medical alcohol negative  Patient had an elevated CK and elevated lactic acid  He is also found to have acute kidney injury  Patient to be admitted for IV fluids, possible tele psych consultation, and observation  In speaking with the patient, patient does not remember event  He does not make good eye contact, and appears suspicious and guarded  He is not able to state the month  He does not recall that it is the 4th of July holiday  He denies any physical abuse in the home, but did have an argument with his father this evening  He lives with his sisters, says he gets along fine with them  His mother lives across town with her new   He said today after the argument he drove to a friend's house  He does not know how long he stayed, or remember the drive home  He denies any use of bath salts or other alternative substance  He denies possibility that he was dosed by another person  He denies audio or visual hallucinations      Review of Systems:    Review of Systems   Constitutional: Negative for chills and fever  Eyes: Negative for visual disturbance  Respiratory: Negative for cough and shortness of breath  Cardiovascular: Negative for chest pain, palpitations and leg swelling  Gastrointestinal: Negative for abdominal distention, abdominal pain, blood in stool, constipation, diarrhea, nausea and vomiting  Genitourinary: Negative for dysuria and flank pain  Musculoskeletal: Negative for arthralgias and myalgias  Skin: Negative for pallor and rash  Neurological: Negative for dizziness, seizures, syncope, weakness, numbness and headaches  Psychiatric/Behavioral: Positive for agitation and confusion  Negative for hallucinations, self-injury and suicidal ideas  All other systems reviewed and are negative  Past Medical and Surgical History:     History reviewed  No pertinent past medical history  History reviewed  No pertinent surgical history  Meds/Allergies:    Prior to Admission medications    Not on File     I have reviewed home medications with patient personally  Allergies: No Known Allergies    Social History:     Marital Status: Single   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  Patient Pre-hospital Living Situation:  Home  Patient Pre-hospital Level of Mobility:  Independent  Patient Pre-hospital Diet Restrictions:  None  Substance Use History:   Social History     Substance and Sexual Activity   Alcohol Use Yes    Frequency: Monthly or less    Drinks per session: 1 or 2     Social History     Tobacco Use   Smoking Status Never Smoker   Smokeless Tobacco Never Used     Social History     Substance and Sexual Activity   Drug Use Never       Family History:    States, "my mother is a little crazy " Unknown diagnosis-no longer taking meds      Physical Exam:     Vitals:   Blood Pressure: 145/80 (07/05/20 0050)  Pulse: 84 (07/05/20 0050)  Temperature: 99 4 °F (37 4 °C) (07/05/20 0050)  Temp Source: Temporal (07/04/20 9695)  Respirations: 18 (07/05/20 0050)  Height: 5' 8" (172 7 cm) (07/05/20 0050)  Weight - Scale: 83 kg (182 lb 15 7 oz) (07/05/20 0050)  SpO2: 100 % (07/05/20 0050)    Physical Exam   Constitutional: He appears well-developed and well-nourished  HENT:   Head: Normocephalic  Swelling and ecchymosis to right forehead  Multiple abrasions to bilateral cheeks  Sutured laceration to bridge of nose  Eyes: Pupils are equal, round, and reactive to light  Conjunctivae and EOM are normal    Neck: Normal range of motion  Neck supple  Cardiovascular: Normal rate, regular rhythm, normal heart sounds and intact distal pulses  Pulmonary/Chest: Effort normal and breath sounds normal    Abdominal: Soft  Bowel sounds are normal    Musculoskeletal: Normal range of motion  Neurological: He is alert  Skin: Skin is warm and dry  Capillary refill takes less than 2 seconds  Multiple scratches to knees, abrasions to chest area   Psychiatric: His affect is blunt  His speech is delayed  He is withdrawn  He expresses no homicidal and no suicidal ideation  He exhibits abnormal recent memory  Poor eye contact  Delayed responses  Disoriented to time and situation  Paucity of speech  Appears suspicious and guarded  Nursing note and vitals reviewed  Additional Data:     Lab Results: I have personally reviewed pertinent reports        Results from last 7 days   Lab Units 07/04/20  2131   WBC Thousand/uL 7 96   HEMOGLOBIN g/dL 14 1   HEMATOCRIT % 41 2   PLATELETS Thousands/uL 294   NEUTROS PCT % 80*   LYMPHS PCT % 8*   MONOS PCT % 10   EOS PCT % 1     Results from last 7 days   Lab Units 07/04/20  2131   SODIUM mmol/L 143   POTASSIUM mmol/L 3 6   CHLORIDE mmol/L 106   CO2 mmol/L 25   BUN mg/dL 18   CREATININE mg/dL 1 80*   ANION GAP mmol/L 12   CALCIUM mg/dL 9 3   ALBUMIN g/dL 4 1   TOTAL BILIRUBIN mg/dL 0 87   ALK PHOS U/L 51   ALT U/L 52   AST U/L 66*   GLUCOSE RANDOM mg/dL 103 Results from last 7 days   Lab Units 07/04/20  2131   LACTIC ACID mmol/L 3 0*       Imaging: I have personally reviewed pertinent reports  CT spine cervical wo contrast   Final Result by Pauline Pickard MD (07/04 2217)      No cervical spine fracture or traumatic malalignment  Workstation performed: [id number]         CT head wo contrast   Final Result by Pauline Pickard MD (07/04 2219)         1  Right anterior frontal scalp soft tissue swelling without evidence for acute intracranial abnormality  Workstation performed: [id number]         XR chest portable    (Results Pending)       EKG, Pathology, and Other Studies Reviewed on Admission:   · EKG: NSR rate of 92 no evidence of acute infarct    Allscripts / Epic Records Reviewed: Yes     ** Please Note: This note has been constructed using a voice recognition system   **

## 2020-07-05 NOTE — PLAN OF CARE
Problem: Potential for Falls  Goal: Patient will remain free of falls  Description  INTERVENTIONS:  - Assess patient frequently for physical needs  -  Identify cognitive and physical deficits and behaviors that affect risk of falls    -  [place] fall precautions as indicated by assessment   - Educate patient/family on patient safety including physical limitations  - Instruct patient to call for assistance with activity based on assessment  - Modify environment to reduce risk of injury  - Consider OT/PT consult to assist with strengthening/mobility  Outcome: Not Progressing     Problem: PAIN - ADULT  Goal: Verbalizes/displays adequate comfort level or baseline comfort level  Description  Interventions:  - Encourage patient to monitor pain and request assistance  - Assess pain using appropriate pain scale  - Administer analgesics based on type and severity of pain and evaluate response  - Implement non-pharmacological measures as appropriate and evaluate response  - Consider cultural and social influences on pain and pain management  - Notify physician/advanced practitioner if interventions unsuccessful or patient reports new pain  Outcome: Not Progressing     Problem: INFECTION - ADULT  Goal: Absence or prevention of progression during hospitalization  Description  INTERVENTIONS:  - Assess and monitor for signs and symptoms of infection  - Monitor lab/diagnostic results  - Monitor all insertion sites, i e  indwelling lines, tubes, and drains  - Monitor endotracheal if appropriate and nasal secretions for changes in amount and color  - Lovell appropriate cooling/warming therapies per order  - Administer medications as ordered  - Instruct and encourage patient and family to use good hand hygiene technique  - Identify and instruct in appropriate isolation precautions for identified infection/condition  Outcome: Not Progressing  Goal: Absence of fever/infection during neutropenic period  Description  INTERVENTIONS:  - Monitor WBC    Outcome: Not Progressing     Problem: SAFETY ADULT  Goal: Maintain or return to baseline ADL function  Description  INTERVENTIONS:  -  Assess patient's ability to carry out ADLs; assess patient's baseline for ADL function and identify physical deficits which impact ability to perform ADLs (bathing, care of mouth/teeth, toileting, grooming, dressing, etc )  - Assess/evaluate cause of self-care deficits   - Assess range of motion  - Assess patient's mobility; develop plan if impaired  - Assess patient's need for assistive devices and provide as appropriate  - Encourage maximum independence but intervene and supervise when necessary  - Involve family in performance of ADLs  - Assess for home care needs following discharge   - Consider OT consult to assist with ADL evaluation and planning for discharge  - Provide patient education as appropriate  Outcome: Not Progressing  Goal: Maintain or return mobility status to optimal level  Description  INTERVENTIONS:  - Assess patient's baseline mobility status (ambulation, transfers, stairs, etc )    - Identify cognitive and physical deficits and behaviors that affect mobility  - Identify mobility aids required to assist with transfers and/or ambulation (gait belt, sit-to-stand, lift, walker, cane, etc )  - [place] fall precautions as indicated by assessment  - Record patient progress and toleration of activity level on Mobility SBAR; progress patient to next Phase/Stage  - Instruct patient to call for assistance with activity based on assessment  - Consider rehabilitation consult to assist with strengthening/weightbearing, etc   Outcome: Not Progressing     Problem: DISCHARGE PLANNING  Goal: Discharge to home or other facility with appropriate resources  Description  INTERVENTIONS:  - Identify barriers to discharge w/patient and caregiver  - Arrange for needed discharge resources and transportation as appropriate  - Identify discharge learning needs (meds, wound care, etc )  - Arrange for interpretive services to assist at discharge as needed  - Refer to Case Management Department for coordinating discharge planning if the patient needs post-hospital services based on physician/advanced practitioner order or complex needs related to functional status, cognitive ability, or social support system  Outcome: Not Progressing     Problem: Knowledge Deficit  Goal: Patient/family/caregiver demonstrates understanding of disease process, treatment plan, medications, and discharge instructions  Description  Complete learning assessment and assess knowledge base    Interventions:  - Provide teaching at level of understanding  - Provide teaching via preferred learning methods  Outcome: Not Progressing

## 2020-07-05 NOTE — ASSESSMENT & PLAN NOTE
· CK:  921  · Received 1 L normal saline  · Continue normal saline at 125 mL/hr  · Recheck CK  · Check urinalysis

## 2020-07-05 NOTE — ASSESSMENT & PLAN NOTE
· Patient has had occurrences of agitated behavior over past 4 months  · Tonight he was found naked and agitated in the rafters of a barn  · He was tazed five times, and fell a distance of about two and a half feet striking his head  · CT head:  Right anterior frontal scalp soft tissue swelling without evidence for acute intracranial abnormality   · CT C-spine: No cervical spine fracture or traumatic malalignment  · CXR:  No acute cardiopulmonary process  · UDS and medical alcohol were negative  · Observation status  · Neuro checks  · Psychiatric consult  · Check TSH

## 2020-07-05 NOTE — NURSING NOTE
Upon entering room for bloodwork with PCA, pt asked if we could come back later  About 10 minutes later upon reentering,  patient states he is in severe pain in his lower back  Patient started rolling around all over the bed, asking for his mom, and crying  MD called in to assess pt  When MD entered room, pt was hunched over the side of the bed  Pt had to be assisted back in bed  Pain medication ordered and was given by nurse  Ativan also ordered per MD for agitation

## 2020-07-05 NOTE — UTILIZATION REVIEW
Initial Clinical Review    Observation   7/4  @    0404   Changed to IP admission   7/5  @   1353  The patient will continue to require additional inpatient hospital stay due to Acute psychosis       Admission: Date/Time/Statement: Admission Orders (From admission, onward)     Ordered        07/04/20 2343  Place in Observation  Once                   07/05/20 1353  Inpatient Admission Once      07/05/20 1353         ED Arrival Information     Expected Arrival [address] of Arrival Escorted By Service Admission Type    7/4/2020 21:07 7/4/2020 21:11 Emergent Ambulance [facility] Hospitalist Emergency    Arrival Complaint    Altered Mental        Chief Complaint   Patient presents with    Altered Mental Status     pt  lefrt home and upon returning 30 min later he was found to have excited delirium, running naked in attic, police were called to scene, pt  was uncooperative, unwuillihng to come down from rafters, "fake" shooting at officers with his finger, pt  was tazed once in the chest and in the back for a few seconds in order to get patient under control, upon EMS arrival pt  was found more cooperative, calm, lac to bridge of nose, multiple scratches, tremors, over heated, denies SI or HI, denies drug use     Assessment/Plan:    23  Y O male  Presents  To ED  Via  EMS from home with altered mental status  Family  States patient  Has  Been having periods  Of  agitation and altered mental status  The day of  Admission, he left home for a  short time, returned and found naked in his barn  5  Police officers were  Needed and five instances of  tazing  To subdue him enough to come to ED  Has no PMH, denies drug/alcohol use  Ct head negative  UDS/BAL  Negative  Labs  Revealed  An elevated  CK, lactic  Acid and  Creatinine  Has  No recollection  Of  Event  Unable to state month or holiday  States he got in an argument  With family  The day of admission and went to a  friends house    Denies any  Possible  Dosing of Substance by anyone  Admit  Observation  With  Transient alteration of  Awareness,  POLINA, elevated  CK and lactic acidosis and plan is   Neuro checks, pschy consult,  IVF and close monitoring  7/5 0228  Found  Crying/hypervigilant, abruptly  Lunged at staff  Refusing to stay  Required  Several reiterations of e vents  Holding  On  Further  Care at moment  ED Triage Vitals [07/04/20 2113]   Temperature Pulse Respirations Blood Pressure SpO2   99 9 °F (37 7 °C) 99 20 153/81 98 %      Temp Source Heart Rate Source Patient Position - Orthostatic VS BP Location FiO2 (%)   Temporal Monitor Lying Left arm --      Pain Score       1        Wt Readings from Last 1 Encounters:   07/05/20 83 kg (182 lb 15 7 oz)     Additional Vital Signs:   07/05/20 07:29:38  99 °F (37 2 °C)  86  16  109/70  83  100 %       07/05/20 0140              None (Room air)     07/05/20 00:50:08  99 4 °F (37 4 °C)  84  18  145/80  102  100 %       07/04/20 2319  99 6 °F (37 6 °C)  100  20  136/75    100 %  None (Room air)  Lying   07/04/20 2245              None (Room air)     07/04/20 2228    85  15  138/67    100 %  None (Room air)  Lying   07/04/20 2113  99 9 °F (37 7 °C)  99  20  153/81  109  98 %  None (Room air)  Lying         Pertinent Labs/Diagnostic Test Results:   EKG   NSR       T waves:     T waves: inverted      Inverted:  III and aVF   Normal  QRS      Ct  C/spine  ( 7/4)     no fracture  Ct  Head  ( 7/4)    Right anterior frontal scalp soft tissue swelling without evidence for acute intracranial abnormality        Results from last 7 days   Lab Units 07/05/20  0726 07/04/20  2131   WBC Thousand/uL 4 77 7 96   HEMOGLOBIN g/dL 10 1* 14 1   HEMATOCRIT % 30 3* 41 2   PLATELETS Thousands/uL 195 294   NEUTROS ABS Thousands/µL 3 23 6 39         Results from last 7 days   Lab Units 07/05/20  0736 07/04/20  2131   SODIUM mmol/L 145 143   POTASSIUM mmol/L 2 8* 3 6   CHLORIDE mmol/L 113* 106   CO2 mmol/L 22 25 ANION GAP mmol/L 10 12   BUN mg/dL 11 18   CREATININE mg/dL 1 07 1 80*   EGFR ml/min/1 73sq m 99 53   CALCIUM mg/dL 6 3* 9 3   MAGNESIUM mg/dL  --  2 0     Results from last 7 days   Lab Units 07/04/20  2131   AST U/L 66*   ALT U/L 52   ALK PHOS U/L 51   TOTAL PROTEIN g/dL 7 7   ALBUMIN g/dL 4 1   TOTAL BILIRUBIN mg/dL 0 87         Results from last 7 days   Lab Units 07/05/20  0736 07/04/20  2131   GLUCOSE RANDOM mg/dL 88 103           Results from last 7 days   Lab Units 07/05/20  0736 07/04/20  2131   CK TOTAL U/L 578* 921*   CK MB INDEX % <1 0 <1 0   CK MB ng/mL 1 2 1 9         Results from last 7 days   Lab Units 07/05/20  0736   TSH 3RD GENERATON uIU/mL 3 837*         Results from last 7 days   Lab Units 07/05/20  0735 07/04/20  2131   LACTIC ACID mmol/L 0 6 3 0*               Results from last 7 days   Lab Units 07/04/20  2131   LIPASE u/L 205             Results from last 7 days   Lab Units 07/04/20  2245   CLARITY UA  Cloudy   COLOR UA  Yellow   SPEC GRAV UA  >=1 030   PH UA  6 5   GLUCOSE UA mg/dl Negative   KETONES UA mg/dl 40 (2+)*   BLOOD UA  Negative   PROTEIN UA mg/dl 100 (2+)*   NITRITE UA  Negative   BILIRUBIN UA  Small*   UROBILINOGEN UA E U /dl 1 0   LEUKOCYTES UA  Negative   WBC UA /hpf 0-5   RBC UA /hpf 0-5   BACTERIA UA /hpf Innumerable*   EPITHELIAL CELLS WET PREP /hpf Occasional             Results from last 7 days   Lab Units 07/04/20  2245   AMPH/METH  Negative   BARBITURATE UR  Negative   BENZODIAZEPINE UR  Negative   COCAINE UR  Negative   METHADONE URINE  Negative   OPIATE UR  Negative   PCP UR  Negative   THC UR  Negative     Results from last 7 days   Lab Units 07/04/20  2131   ETHANOL LVL mg/dL <3             ED Treatment:   Medication Administration from 07/04/2020 2111 to 07/05/2020 0042       Date/Time Order Dose Route Action Comments     07/04/2020 2330 sodium chloride 0 9 % bolus 1,000 mL 0 mL Intravenous Stopped      07/04/2020 2133 sodium chloride 0 9 % bolus 1,000 mL 1,000 mL Intravenous New Bag      07/04/2020 2349 sodium chloride 0 9 % infusion 125 mL/hr Intravenous New Bag         Admitting Diagnosis: Transient alteration of awareness [R40 4]  Altered mental status [R41 82]  POLINA (acute kidney injury) ([facility] [address] ) [N17 9]  Age/Sex: 23 y o  male  Admission Orders:  Scheduled Medications:     Continuous IV Infusions:    sodium chloride 125 mL/hr Intravenous Continuous     PRN Meds:    ondansetron 4 mg Intravenous Q6H PRN       IP CONSULT TO PSYCHIATRY    Network Utilization Review Department  [email or web address]  ATTENTION: Please call with any questions or concerns to [phone] and carefully listen to the prompts so that you are directed to the right person  All voicemails are confidential   Luciano Quevedo all requests for admission clinical reviews, approved or denied determinations and any other requests to dedicated fax number below belonging to the campus where the patient is receiving treatment   List of dedicated fax numbers for the Facilities:  [address] DENIALS (Administrative/Medical Necessity) [phone]   [address] (Maternity/NICU/Pediatrics) [phone]   [place] [phone]   Ladean Ros [phone]   [facility] [phone]   [facility] [phone]   [address] [place] [address] [phone]   [facility]  [phone]   [address] [facility]  [address] [facility] [address] [facility] [phone]

## 2020-07-05 NOTE — PLAN OF CARE
Problem: Potential for Falls  Goal: Patient will remain free of falls  Description  INTERVENTIONS:  - Assess patient frequently for physical needs  -  Identify cognitive and physical deficits and behaviors that affect risk of falls    -  [place] fall precautions as indicated by assessment   - Educate patient/family on patient safety including physical limitations  - Instruct patient to call for assistance with activity based on assessment  - Modify environment to reduce risk of injury  - Consider OT/PT consult to assist with strengthening/mobility  Outcome: Progressing     Problem: PAIN - ADULT  Goal: Verbalizes/displays adequate comfort level or baseline comfort level  Description  Interventions:  - Encourage patient to monitor pain and request assistance  - Assess pain using appropriate pain scale  - Administer analgesics based on type and severity of pain and evaluate response  - Implement non-pharmacological measures as appropriate and evaluate response  - Consider cultural and social influences on pain and pain management  - Notify physician/advanced practitioner if interventions unsuccessful or patient reports new pain  Outcome: Progressing     Problem: INFECTION - ADULT  Goal: Absence or prevention of progression during hospitalization  Description  INTERVENTIONS:  - Assess and monitor for signs and symptoms of infection  - Monitor lab/diagnostic results  - Monitor all insertion sites, i e  indwelling lines, tubes, and drains  - Monitor endotracheal if appropriate and nasal secretions for changes in amount and color  - [place] appropriate cooling/warming therapies per order  - Administer medications as ordered  - Instruct and encourage patient and family to use good hand hygiene technique  - Identify and instruct in appropriate isolation precautions for identified infection/condition  Outcome: Progressing  Goal: Absence of fever/infection during neutropenic period  Description  INTERVENTIONS:  - Monitor WBC    Outcome: Progressing     Problem: SAFETY ADULT  Goal: Maintain or return to baseline ADL function  Description  INTERVENTIONS:  -  Assess patient's ability to carry out ADLs; assess patient's baseline for ADL function and identify physical deficits which impact ability to perform ADLs (bathing, care of mouth/teeth, toileting, grooming, dressing, etc )  - Assess/evaluate cause of self-care deficits   - Assess range of motion  - Assess patient's mobility; develop plan if impaired  - Assess patient's need for assistive devices and provide as appropriate  - Encourage maximum independence but intervene and supervise when necessary  - Involve family in performance of ADLs  - Assess for home care needs following discharge   - Consider OT consult to assist with ADL evaluation and planning for discharge  - Provide patient education as appropriate  Outcome: Progressing  Goal: Maintain or return mobility status to optimal level  Description  INTERVENTIONS:  - Assess patient's baseline mobility status (ambulation, transfers, stairs, etc )    - Identify cognitive and physical deficits and behaviors that affect mobility  - Identify mobility aids required to assist with transfers and/or ambulation (gait belt, sit-to-stand, lift, walker, cane, etc )  - [place] fall precautions as indicated by assessment  - Record patient progress and toleration of activity level on Mobility SBAR; progress patient to next Phase/Stage  - Instruct patient to call for assistance with activity based on assessment  - Consider rehabilitation consult to assist with strengthening/weightbearing, etc   Outcome: Progressing     Problem: DISCHARGE PLANNING  Goal: Discharge to home or other facility with appropriate resources  Description  INTERVENTIONS:  - Identify barriers to discharge w/patient and caregiver  - Arrange for needed discharge resources and transportation as appropriate  - Identify discharge learning needs (meds, wound care, etc )  - Arrange for interpretive services to assist at discharge as needed  - Refer to Case Management Department for coordinating discharge planning if the patient needs post-hospital services based on physician/advanced practitioner order or complex needs related to functional status, cognitive ability, or social support system  Outcome: Progressing     Problem: Knowledge Deficit  Goal: Patient/family/caregiver demonstrates understanding of disease process, treatment plan, medications, and discharge instructions  Description  Complete learning assessment and assess knowledge base    Interventions:  - Provide teaching at level of understanding  - Provide teaching via preferred learning methods  Outcome: Progressing

## 2020-07-05 NOTE — ASSESSMENT & PLAN NOTE
· Creatinine 1 8  · Received normal saline 1 L  · Continue normal saline at 125mL/hr  · Recheck metabolic panel and trend creatinine  · Avoid hypotension and nephrotoxins  · Repeat creatinine is 1 07 and hence acute kidney injury has resolved

## 2020-07-05 NOTE — ASSESSMENT & PLAN NOTE
· Patient sustained laceration which was repaired by ER physician  · Could consider a plastic surgery consult as outpatient

## 2020-07-05 NOTE — NURSING NOTE
Upon entering pts room, pt was in bathroom naked  Patient ripped out his IV, took off his claudine, and stated he was going to get a shower  Nurse explained he needed a shower order and would speak to dr Dr rasmussen pt a shower order  Staying with pt while in the bathroom

## 2020-07-05 NOTE — ASSESSMENT & PLAN NOTE
· Creatinine 1 8  · Received normal saline 1 L  · Continue normal saline at 125mL/hr  · Recheck metabolic panel and trend creatinine  · Avoid hypotension and nephrotoxins

## 2020-07-05 NOTE — PROGRESS NOTES
Done this evening show critical level of calcium and potassium  Repeat BMP ordered for confirmation  Will give 40 mEq oral potassium now and 1 g of IV calcium chloride now  Repeat BMP is pending at this time    Placed on telemetry monitor in case electrolyte abnormality is real

## 2020-07-06 VITALS
BODY MASS INDEX: 27.73 KG/M2 | TEMPERATURE: 97.8 F | HEIGHT: 68 IN | SYSTOLIC BLOOD PRESSURE: 150 MMHG | WEIGHT: 182.98 LBS | OXYGEN SATURATION: 100 % | DIASTOLIC BLOOD PRESSURE: 95 MMHG | RESPIRATION RATE: 18 BRPM | HEART RATE: 97 BPM

## 2020-07-06 LAB
ANION GAP SERPL CALCULATED.3IONS-SCNC: 6 MMOL/L (ref 4–13)
BUN SERPL-MCNC: 8 MG/DL (ref 5–25)
CALCIUM SERPL-MCNC: 8.4 MG/DL (ref 8.3–10.1)
CHLORIDE SERPL-SCNC: 106 MMOL/L (ref 100–108)
CK MB SERPL-MCNC: 1.1 NG/ML (ref 0–5)
CK MB SERPL-MCNC: <1 % (ref 0–2.5)
CK SERPL-CCNC: 439 U/L (ref 39–308)
CO2 SERPL-SCNC: 30 MMOL/L (ref 21–32)
CREAT SERPL-MCNC: 1.19 MG/DL (ref 0.6–1.3)
GFR SERPL CREATININE-BSD FRML MDRD: 87 ML/MIN/1.73SQ M
GLUCOSE SERPL-MCNC: 92 MG/DL (ref 65–140)
POTASSIUM SERPL-SCNC: 3.9 MMOL/L (ref 3.5–5.3)
SODIUM SERPL-SCNC: 142 MMOL/L (ref 136–145)

## 2020-07-06 PROCEDURE — 82550 ASSAY OF CK (CPK): CPT | Performed by: FAMILY MEDICINE

## 2020-07-06 PROCEDURE — 80048 BASIC METABOLIC PNL TOTAL CA: CPT | Performed by: FAMILY MEDICINE

## 2020-07-06 PROCEDURE — 99239 HOSP IP/OBS DSCHRG MGMT >30: CPT | Performed by: FAMILY MEDICINE

## 2020-07-06 PROCEDURE — 82553 CREATINE MB FRACTION: CPT | Performed by: FAMILY MEDICINE

## 2020-07-06 RX ADMIN — SODIUM CHLORIDE 125 ML/HR: 0.9 INJECTION, SOLUTION INTRAVENOUS at 00:57

## 2020-07-06 RX ADMIN — SODIUM CHLORIDE 125 ML/HR: 0.9 INJECTION, SOLUTION INTRAVENOUS at 09:34

## 2020-07-06 RX ADMIN — OLANZAPINE 10 MG: 2.5 TABLET, FILM COATED ORAL at 01:04

## 2020-07-06 NOTE — NURSING NOTE
Pt meets discharge criteria, IV removed  Reviewed exit care and discharge instructions with pt and pt's grandmother; both verbalized understanding  All questions answered  Paperwork given to pt  Pt has belongings

## 2020-07-06 NOTE — PLAN OF CARE
Problem: Potential for Falls  Goal: Patient will remain free of falls  Description  INTERVENTIONS:  - Assess patient frequently for physical needs  -  Identify cognitive and physical deficits and behaviors that affect risk of falls    -  [place] fall precautions as indicated by assessment   - Educate patient/family on patient safety including physical limitations  - Instruct patient to call for assistance with activity based on assessment  - Modify environment to reduce risk of injury  - Consider OT/PT consult to assist with strengthening/mobility  Outcome: Adequate for Discharge     Problem: PAIN - ADULT  Goal: Verbalizes/displays adequate comfort level or baseline comfort level  Description  Interventions:  - Encourage patient to monitor pain and request assistance  - Assess pain using appropriate pain scale  - Administer analgesics based on type and severity of pain and evaluate response  - Implement non-pharmacological measures as appropriate and evaluate response  - Consider cultural and social influences on pain and pain management  - Notify physician/advanced practitioner if interventions unsuccessful or patient reports new pain  Outcome: Adequate for Discharge     Problem: INFECTION - ADULT  Goal: Absence or prevention of progression during hospitalization  Description  INTERVENTIONS:  - Assess and monitor for signs and symptoms of infection  - Monitor lab/diagnostic results  - Monitor all insertion sites, i e  indwelling lines, tubes, and drains  - Monitor endotracheal if appropriate and nasal secretions for changes in amount and color  - [place] appropriate cooling/warming therapies per order  - Administer medications as ordered  - Instruct and encourage patient and family to use good hand hygiene technique  - Identify and instruct in appropriate isolation precautions for identified infection/condition  Outcome: Adequate for Discharge  Goal: Absence of fever/infection during neutropenic period  Description  INTERVENTIONS:  - Monitor WBC    Outcome: Adequate for Discharge     Problem: SAFETY ADULT  Goal: Maintain or return to baseline ADL function  Description  INTERVENTIONS:  -  Assess patient's ability to carry out ADLs; assess patient's baseline for ADL function and identify physical deficits which impact ability to perform ADLs (bathing, care of mouth/teeth, toileting, grooming, dressing, etc )  - Assess/evaluate cause of self-care deficits   - Assess range of motion  - Assess patient's mobility; develop plan if impaired  - Assess patient's need for assistive devices and provide as appropriate  - Encourage maximum independence but intervene and supervise when necessary  - Involve family in performance of ADLs  - Assess for home care needs following discharge   - Consider OT consult to assist with ADL evaluation and planning for discharge  - Provide patient education as appropriate  Outcome: Adequate for Discharge  Goal: Maintain or return mobility status to optimal level  Description  INTERVENTIONS:  - Assess patient's baseline mobility status (ambulation, transfers, stairs, etc )    - Identify cognitive and physical deficits and behaviors that affect mobility  - Identify mobility aids required to assist with transfers and/or ambulation (gait belt, sit-to-stand, lift, walker, cane, etc )  - [place] fall precautions as indicated by assessment  - Record patient progress and toleration of activity level on Mobility SBAR; progress patient to next Phase/Stage  - Instruct patient to call for assistance with activity based on assessment  - Consider rehabilitation consult to assist with strengthening/weightbearing, etc   Outcome: Adequate for Discharge     Problem: DISCHARGE PLANNING  Goal: Discharge to home or other facility with appropriate resources  Description  INTERVENTIONS:  - Identify barriers to discharge w/patient and caregiver  - Arrange for needed discharge resources and transportation as appropriate  - Identify discharge learning needs (meds, wound care, etc )  - Arrange for interpretive services to assist at discharge as needed  - Refer to Case Management Department for coordinating discharge planning if the patient needs post-hospital services based on physician/advanced practitioner order or complex needs related to functional status, cognitive ability, or social support system  Outcome: Adequate for Discharge     Problem: Knowledge Deficit  Goal: Patient/family/caregiver demonstrates understanding of disease process, treatment plan, medications, and discharge instructions  Description  Complete learning assessment and assess knowledge base    Interventions:  - Provide teaching at level of understanding  - Provide teaching via preferred learning methods  Outcome: Adequate for Discharge
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
## 2020-07-06 NOTE — ASSESSMENT & PLAN NOTE
PRE-OPERATIVE HISTORY    1.  Medical Problems:    There is no previous medical history on file.    2.  Medications:    No current facility-administered medications for this encounter.      No current outpatient medications on file.       3.  Allergies/Drug Sensitivities:    ALLERGIES: not on file.      PHYSICAL EXAM    GENERAL: awake, alert and oriented and in no acute distress    LUNGS:  clear to auscultation    CARDIOVASCULAR: regular rate and rhythm    ABDOMEN:  soft, non-tender, nondistended, no hepatosplenomegaly, normal active bowel sounds, and no masses palpated    EXTREMITIES:  no erythema, induration, no evidence of joint effusion, Range of motion of all joints is normal     · Lactic acid 3 on admission  · Received IV hydration and lactic acidosis has now resolved

## 2020-07-06 NOTE — PLAN OF CARE
Problem: Potential for Falls  Goal: Patient will remain free of falls  Description  INTERVENTIONS:  - Assess patient frequently for physical needs  -  Identify cognitive and physical deficits and behaviors that affect risk of falls    -  [place] fall precautions as indicated by assessment   - Educate patient/family on patient safety including physical limitations  - Instruct patient to call for assistance with activity based on assessment  - Modify environment to reduce risk of injury  - Consider OT/PT consult to assist with strengthening/mobility  Outcome: Progressing     Problem: PAIN - ADULT  Goal: Verbalizes/displays adequate comfort level or baseline comfort level  Description  Interventions:  - Encourage patient to monitor pain and request assistance  - Assess pain using appropriate pain scale  - Administer analgesics based on type and severity of pain and evaluate response  - Implement non-pharmacological measures as appropriate and evaluate response  - Consider cultural and social influences on pain and pain management  - Notify physician/advanced practitioner if interventions unsuccessful or patient reports new pain  Outcome: Progressing     Problem: INFECTION - ADULT  Goal: Absence or prevention of progression during hospitalization  Description  INTERVENTIONS:  - Assess and monitor for signs and symptoms of infection  - Monitor lab/diagnostic results  - Monitor all insertion sites, i e  indwelling lines, tubes, and drains  - Monitor endotracheal if appropriate and nasal secretions for changes in amount and color  - [place] appropriate cooling/warming therapies per order  - Administer medications as ordered  - Instruct and encourage patient and family to use good hand hygiene technique  - Identify and instruct in appropriate isolation precautions for identified infection/condition  Outcome: Progressing  Goal: Absence of fever/infection during neutropenic period  Description  INTERVENTIONS:  - Monitor WBC    Outcome: Progressing     Problem: SAFETY ADULT  Goal: Maintain or return to baseline ADL function  Description  INTERVENTIONS:  -  Assess patient's ability to carry out ADLs; assess patient's baseline for ADL function and identify physical deficits which impact ability to perform ADLs (bathing, care of mouth/teeth, toileting, grooming, dressing, etc )  - Assess/evaluate cause of self-care deficits   - Assess range of motion  - Assess patient's mobility; develop plan if impaired  - Assess patient's need for assistive devices and provide as appropriate  - Encourage maximum independence but intervene and supervise when necessary  - Involve family in performance of ADLs  - Assess for home care needs following discharge   - Consider OT consult to assist with ADL evaluation and planning for discharge  - Provide patient education as appropriate  Outcome: Progressing  Goal: Maintain or return mobility status to optimal level  Description  INTERVENTIONS:  - Assess patient's baseline mobility status (ambulation, transfers, stairs, etc )    - Identify cognitive and physical deficits and behaviors that affect mobility  - Identify mobility aids required to assist with transfers and/or ambulation (gait belt, sit-to-stand, lift, walker, cane, etc )  - [place] fall precautions as indicated by assessment  - Record patient progress and toleration of activity level on Mobility SBAR; progress patient to next Phase/Stage  - Instruct patient to call for assistance with activity based on assessment  - Consider rehabilitation consult to assist with strengthening/weightbearing, etc   Outcome: Progressing     Problem: DISCHARGE PLANNING  Goal: Discharge to home or other facility with appropriate resources  Description  INTERVENTIONS:  - Identify barriers to discharge w/patient and caregiver  - Arrange for needed discharge resources and transportation as appropriate  - Identify discharge learning needs (meds, wound care, etc )  - Arrange for interpretive services to assist at discharge as needed  - Refer to Case Management Department for coordinating discharge planning if the patient needs post-hospital services based on physician/advanced practitioner order or complex needs related to functional status, cognitive ability, or social support system  Outcome: Progressing     Problem: Knowledge Deficit  Goal: Patient/family/caregiver demonstrates understanding of disease process, treatment plan, medications, and discharge instructions  Description  Complete learning assessment and assess knowledge base    Interventions:  - Provide teaching at level of understanding  - Provide teaching via preferred learning methods  Outcome: Progressing
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
## 2020-07-06 NOTE — NURSING NOTE
Pt discharged, but left paperwork in room  Pt's father, Yanelis Shaver called; stated he would call pt's grandmother (his ride) and let her know to come back for the discharge paperwork

## 2020-07-06 NOTE — ASSESSMENT & PLAN NOTE
· Creatinine 1 8 on admission  Secondary to acute agitation    Now resolved after IV fluid hydration

## 2020-07-06 NOTE — DISCHARGE SUMMARY
Discharge- Massimo Naranjo 2000, 23 y o  male MRN: [id number]    Unit/Bed#: -01 Encounter: [id number]    Primary Care Provider: Dayo Kumar MD   Date and time admitted to hospital: 2020  9:11 PM        * Acute psychosis ([address] )  Assessment & Plan  · Patient has had occurrences of agitated behavior over past 4 months  · Yesterday he was found naked and agitated in the rafters of a barn  · He was tazed five times, and fell a distance of about two and a half feet striking his head  · CT head:  Right anterior frontal scalp soft tissue swelling without evidence for acute intracranial abnormality   · CT C-spine: No cervical spine fracture or traumatic malalignment  · CXR:  No acute cardiopulmonary process  · UDS and medical alcohol were negative    Patient is awake and alert now  He does not remember the episode that happened yesterday at all  Patient finally admitted today that he used LSD  He was vaping that  He has recently been under the influence bad company and experimenting with drugs  He has no psychiatric reason to have psychosis at this time  Discussed with patient and his grandmother at bedside  He will be released from the hospital today  Given him information about drug and alcohol rehab programs and also counseled him that he must stay away from drugs and company that provides him drugs as he could have  or killed somebody  He understands and is being discharged home today    Laceration of nose  Assessment & Plan  · Patient sustained laceration which was repaired by ER physician  · Could consider a plastic surgery consult as outpatient    Lactic acidosis  Assessment & Plan  · Lactic acid 3 on admission  · Received IV hydration and lactic acidosis has now resolved  Traumatic rhabdomyolysis (HCC)  Assessment & Plan  · CK:  921 on admission secondary to agitation and LSD use    Patient is very agitated and was Tazed several times by  and tried to be restrained  · Now resolved    POLINA (acute kidney injury) ([facility] [address] )  Assessment & Plan  · Creatinine 1 8 on admission  Secondary to acute agitation  Now resolved after IV fluid hydration      Discharging Physician / Practitioner: Jamal Feldman MD  PCP: Katerina Peterson MD  Admission Date:   Admission Orders (From admission, onward)     Ordered        07/05/20 1353  Inpatient Admission  Once         07/04/20 2343  Place in Observation  Once                   Discharge Date: 07/06/20    Resolved Problems  Date Reviewed: 7/6/2020    None          Consultations During Hospital Stay:  · Psychiatry    Procedures Performed:   · None    Significant Findings / Test Results:   Xr Chest Portable    Result Date: 7/5/2020  Impression: No acute cardiopulmonary disease  Workstation performed: [id number]     Ct Head Wo Contrast    Result Date: 7/4/2020  Impression: 1  Right anterior frontal scalp soft tissue swelling without evidence for acute intracranial abnormality  Workstation performed: [id number]     Ct Spine Cervical Wo Contrast    Result Date: 7/4/2020  Impression: No cervical spine fracture or traumatic malalignment  Workstation performed: [id number]     Incidental Findings:   · None     Test Results Pending at Discharge (will require follow up): · None     Outpatient Tests Requested:  · None    Complications:  None    Reason for Admission:  Acute psychosis    Hospital Course:     Jose Elias Meyers is a 23 y o  male patient who originally presented to the hospital on 7/4/2020 due to acute psychosis and confusion  Patient was found naked in the barn and please was called and he was very agitated and was stay several times by them and brought to the hospital with continual agitation and confusion  He was found have acute kidney injury and rhabdomyolysis  Placed on IV fluid hydration following which resolved  Continue to have some symptoms yesterday but today is feeling much better    He finally admitted today that he had been using LSD  He recently has been experimenting drugs since the quarantine for COVID-19 happened  No prior psychiatric history  Currently denies any suicidal or homicidal ideations or intent  Denies any depression  Discussed with grandmother at bedside  Patient will be discharged home with information about outpatient drug and alcohol rehab  No psychiatric medications upon discharge  Needs outpatient follow-up with Psychiatry if he continues to have any thoughts of depression or anxiety  Please see above list of diagnoses and related plan for additional information  Condition at Discharge: good     Discharge Day Visit / Exam:     Subjective:  Patient denies any chest pain or shortness of breath or abdominal pain  Vitals: Blood Pressure: 150/95 (07/06/20 1056)  Pulse: 97 (07/06/20 1056)  Temperature: 97 8 °F (36 6 °C) (07/06/20 1056)  Temp Source: Axillary (07/06/20 1056)  Respirations: 18 (07/06/20 1056)  Height: 5' 8" (172 7 cm) (07/05/20 0050)  Weight - Scale: 83 kg (182 lb 15 7 oz) (07/05/20 0050)  SpO2: 100 % (07/06/20 1056)  Exam:   Physical Exam   Constitutional: He is oriented to person, place, and time  He appears well-developed and well-nourished  HENT:   Head: Normocephalic and atraumatic  Right Ear: External ear normal    Left Ear: External ear normal    Mouth/Throat: Oropharynx is clear and moist    Dressings on his nose and laceration superficial present on his face   Eyes: Pupils are equal, round, and reactive to light  Conjunctivae and EOM are normal    Neck: Normal range of motion  Neck supple  Cardiovascular: Normal rate, regular rhythm, normal heart sounds and intact distal pulses  Pulmonary/Chest: Effort normal and breath sounds normal    Abdominal: Soft  Bowel sounds are normal  He exhibits no mass  There is no tenderness  There is no rebound and no guarding  Genitourinary:   Genitourinary Comments: deferred   Musculoskeletal: Normal range of motion  Neurological: He is alert and oriented to person, place, and time  He has normal reflexes  Cranial nerves 2-12 are normal   Normal neurological exam   Skin: Skin is warm and dry  No rash noted  Psychiatric: He has a normal mood and affect  Nursing note and vitals reviewed  Discussion with Family:  Discussed with grandmother at bedside    Discharge instructions/Information to patient and family:   See after visit summary for information provided to patient and family  Provisions for Follow-Up Care:  See after visit summary for information related to follow-up care and any pertinent home health orders  Disposition:     Home    For Discharges to [facility]:   · Not Applicable to this Patient - Not Applicable to this Patient    Planned Readmission:  None     Discharge Statement:  I spent 35 minutes discharging the patient  This time was spent on the day of discharge  I had direct contact with the patient on the day of discharge  Greater than 50% of the total time was spent examining patient, answering all patient questions, arranging and discussing plan of care with patient as well as directly providing post-discharge instructions  Additional time then spent on discharge activities  Discharge Medications:  See after visit summary for reconciled discharge medications provided to patient and family        ** Please Note: This note has been constructed using a voice recognition system **

## 2020-07-06 NOTE — CONSULTS
Name: Thalia Peter      : 2000  Date: 2020       Time:  10:26pm    [facility]  [facility]   This evaluation was conducted via telepsychiatry with the assistance of onsite staff    Chief Complaint: altered mental status  History of Present Illness: The patient is a 18-year-old  male admitted to the hospital due to altered status  The patient was found naked in the rafters of his barn  He was tazed several times by police  Admission, the patient was found to have hypokalemia and hypocalcemia  He was bizarre and laughing appropriately at times  A psychiatric consult was requested  When interviewed, the patient was slightly irritable but otherwise appropriate  He stated that he had no memory of the events that led up to his medical admission  I have a bad memory and I am always forgetting stuff    According to the hospital record, the patient arguing with his father and 30 minutes later he was found naked in the barn  The patient adamantly denies drug use  He is not currently reporting any thoughts of suicide  He denies hallucinations and there is no evidence of delusions  Patient was cooperative and answer all the questions posed in by the psychiatrist   Collateral: The father Tatyana Ordoñez: [phone]) was called, but he cannot be reached (voicemail)  Hospital records reveal the father told ER staff that the patient has been having episodes of altered mental status and agitation for the past 4 months  SI/ Self harm: no prior attempts  HI/Violence: none  Trauma History: none  Access to weapons: none  Legal: none  Psychiatric History/Treatment History: No inpt hx  No current outpatient care  Drug/Alcohol History: The patient reports he drinks alcohol a few times a month and last drank three days ago    Medical History: none  Medications & Freq: Ativan 0 5 mg IV Q6hrs PRN anxiety/agitation  Allergies: NKDA  Family Psych History/History of suicide: mother-drug  Social History: single, Lives with dad and 2 sisters (8y,14y)   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   Education: HS grad, some college   Stressors: see HPI   Strengths/supports: family, friends  Mental Status Exam:   Appearance and attire: appears stated age, dressed in hospital attire  Attitude and behavior: cooperative  Speech: WNL  Affect and mood: restricted affect, irritable mood  Association and thought processes: linear  Thought content: no SI, no HI, no delusions  Perception: no AVH  Sensorium, memory, and orientation: AAOx3  Intellectual functioning: average  Insight and judgment: poor  Impression/Risk Assessment: The patient is a 23-year-old male who presented to the hospital by police after being found naked in a barn  The patient arrived at the hospital confused, disoriented, and bizarre  He has become more oriented and appropriate over time as the electrolyte disturbances originally found have been corrected  The patient denies a history of psychiatric illness  He also denies drug use although exposure to an unknown agent could have explained his severe and drastic transformation as well as his rapid improvement  While the patient denies drug use, inhalant abuse could explain the electrolyte services (hypocalcemia, hypokalemia), lack of memory, confusion, and hyperactivity  It would also explain the episodes of altered mental status and agitation as well as the patients poor memory  The patient is not currently a danger to self or others and there does not appear to be a need for chronic administration psychiatric medication  Patient should be maintained on the medical unit until cleared with PRN's given as necessary  He can be referred to outpatient care once discharged from the hospital   Diagnosis: Delirium, Rule Out Inhalant Use Disorder   Treatment Recommendations: Start Zyprexa 10 mg PO/IM BID PRN agitation   Start Ativan 2 mg IV Q6 hours PRN anxiety/agitation  Refer to outpatient care once medically cleared  Therapy: Supportive, substance use  Level of Care: Outpatient care  MALI Wise    [facility]

## 2020-07-07 ENCOUNTER — APPOINTMENT (EMERGENCY)
Dept: RADIOLOGY | Facility: HOSPITAL | Age: 20
End: 2020-07-07
Payer: COMMERCIAL

## 2020-07-07 ENCOUNTER — HOSPITAL ENCOUNTER (EMERGENCY)
Facility: HOSPITAL | Age: 20
Discharge: HOME | End: 2020-07-08
Attending: EMERGENCY MEDICINE | Admitting: EMERGENCY MEDICINE
Payer: COMMERCIAL

## 2020-07-07 DIAGNOSIS — F19.10 SUBSTANCE ABUSE (HCC): Primary | ICD-10-CM

## 2020-07-07 LAB
ALBUMIN SERPL BCP-MCNC: 4 G/DL (ref 3.5–5)
ALP SERPL-CCNC: 52 U/L (ref 46–116)
ALT SERPL W P-5'-P-CCNC: 39 U/L (ref 12–78)
AMPHETAMINES SERPL QL SCN: NEGATIVE
ANION GAP SERPL CALCULATED.3IONS-SCNC: 11 MMOL/L (ref 4–13)
AST SERPL W P-5'-P-CCNC: 41 U/L (ref 5–45)
BARBITURATES UR QL: NEGATIVE
BASOPHILS # BLD AUTO: 0.02 THOUSANDS/ΜL (ref 0–0.1)
BASOPHILS NFR BLD AUTO: 0 % (ref 0–1)
BENZODIAZ UR QL: NEGATIVE
BILIRUB SERPL-MCNC: 0.58 MG/DL (ref 0.2–1)
BUN SERPL-MCNC: 13 MG/DL (ref 5–25)
CALCIUM SERPL-MCNC: 8.7 MG/DL (ref 8.3–10.1)
CHLORIDE SERPL-SCNC: 102 MMOL/L (ref 100–108)
CK MB SERPL-MCNC: 1.1 NG/ML (ref 0–5)
CK MB SERPL-MCNC: <1 % (ref 0–2.5)
CK SERPL-CCNC: 321 U/L (ref 39–308)
CO2 SERPL-SCNC: 26 MMOL/L (ref 21–32)
COCAINE UR QL: NEGATIVE
CREAT SERPL-MCNC: 1.42 MG/DL (ref 0.6–1.3)
EOSINOPHIL # BLD AUTO: 0.14 THOUSAND/ΜL (ref 0–0.61)
EOSINOPHIL NFR BLD AUTO: 3 % (ref 0–6)
ERYTHROCYTE [DISTWIDTH] IN BLOOD BY AUTOMATED COUNT: 11.5 % (ref 11.6–15.1)
ETHANOL SERPL-MCNC: <3 MG/DL (ref 0–3)
GFR SERPL CREATININE-BSD FRML MDRD: 70 ML/MIN/1.73SQ M
GLUCOSE SERPL-MCNC: 118 MG/DL (ref 65–140)
HCT VFR BLD AUTO: 39.4 % (ref 36.5–49.3)
HGB BLD-MCNC: 13.5 G/DL (ref 12–17)
IMM GRANULOCYTES # BLD AUTO: 0.02 THOUSAND/UL (ref 0–0.2)
IMM GRANULOCYTES NFR BLD AUTO: 0 % (ref 0–2)
LACTATE SERPL-SCNC: 0.8 MMOL/L (ref 0.5–2)
LACTATE SERPL-SCNC: 3.3 MMOL/L (ref 0.5–2)
LYMPHOCYTES # BLD AUTO: 1.06 THOUSANDS/ΜL (ref 0.6–4.47)
LYMPHOCYTES NFR BLD AUTO: 20 % (ref 14–44)
MAGNESIUM SERPL-MCNC: 1.8 MG/DL (ref 1.6–2.6)
MCH RBC QN AUTO: 29.2 PG (ref 26.8–34.3)
MCHC RBC AUTO-ENTMCNC: 34.3 G/DL (ref 31.4–37.4)
MCV RBC AUTO: 85 FL (ref 82–98)
METHADONE UR QL: NEGATIVE
MONOCYTES # BLD AUTO: 0.46 THOUSAND/ΜL (ref 0.17–1.22)
MONOCYTES NFR BLD AUTO: 9 % (ref 4–12)
NEUTROPHILS # BLD AUTO: 3.62 THOUSANDS/ΜL (ref 1.85–7.62)
NEUTS SEG NFR BLD AUTO: 68 % (ref 43–75)
NRBC BLD AUTO-RTO: 0 /100 WBCS
OPIATES UR QL SCN: NEGATIVE
OXYCODONE+OXYMORPHONE UR QL SCN: NEGATIVE
PCP UR QL: NEGATIVE
PLATELET # BLD AUTO: 276 THOUSANDS/UL (ref 149–390)
PMV BLD AUTO: 9.2 FL (ref 8.9–12.7)
POTASSIUM SERPL-SCNC: 3.5 MMOL/L (ref 3.5–5.3)
PROT SERPL-MCNC: 7.5 G/DL (ref 6.4–8.2)
RBC # BLD AUTO: 4.63 MILLION/UL (ref 3.88–5.62)
SARS-COV-2 RNA RESP QL NAA+PROBE: NEGATIVE
SODIUM SERPL-SCNC: 139 MMOL/L (ref 136–145)
THC UR QL: NEGATIVE
WBC # BLD AUTO: 5.32 THOUSAND/UL (ref 4.31–10.16)

## 2020-07-07 PROCEDURE — 80320 DRUG SCREEN QUANTALCOHOLS: CPT | Performed by: PHYSICIAN ASSISTANT

## 2020-07-07 PROCEDURE — 80307 DRUG TEST PRSMV CHEM ANLYZR: CPT | Performed by: PHYSICIAN ASSISTANT

## 2020-07-07 PROCEDURE — 83735 ASSAY OF MAGNESIUM: CPT | Performed by: PHYSICIAN ASSISTANT

## 2020-07-07 PROCEDURE — 73130 X-RAY EXAM OF HAND: CPT

## 2020-07-07 PROCEDURE — 71045 X-RAY EXAM CHEST 1 VIEW: CPT

## 2020-07-07 PROCEDURE — 83605 ASSAY OF LACTIC ACID: CPT | Performed by: PHYSICIAN ASSISTANT

## 2020-07-07 PROCEDURE — 73110 X-RAY EXAM OF WRIST: CPT

## 2020-07-07 PROCEDURE — 36415 COLL VENOUS BLD VENIPUNCTURE: CPT | Performed by: PHYSICIAN ASSISTANT

## 2020-07-07 PROCEDURE — 99285 EMERGENCY DEPT VISIT HI MDM: CPT

## 2020-07-07 PROCEDURE — 96372 THER/PROPH/DIAG INJ SC/IM: CPT

## 2020-07-07 PROCEDURE — 93005 ELECTROCARDIOGRAM TRACING: CPT

## 2020-07-07 PROCEDURE — 85025 COMPLETE CBC W/AUTO DIFF WBC: CPT | Performed by: PHYSICIAN ASSISTANT

## 2020-07-07 PROCEDURE — 80053 COMPREHEN METABOLIC PANEL: CPT | Performed by: PHYSICIAN ASSISTANT

## 2020-07-07 PROCEDURE — 82550 ASSAY OF CK (CPK): CPT | Performed by: PHYSICIAN ASSISTANT

## 2020-07-07 PROCEDURE — 96361 HYDRATE IV INFUSION ADD-ON: CPT

## 2020-07-07 PROCEDURE — 99285 EMERGENCY DEPT VISIT HI MDM: CPT | Performed by: PHYSICIAN ASSISTANT

## 2020-07-07 PROCEDURE — 96374 THER/PROPH/DIAG INJ IV PUSH: CPT

## 2020-07-07 PROCEDURE — 87635 SARS-COV-2 COVID-19 AMP PRB: CPT | Performed by: PHYSICIAN ASSISTANT

## 2020-07-07 PROCEDURE — 82553 CREATINE MB FRACTION: CPT | Performed by: PHYSICIAN ASSISTANT

## 2020-07-07 RX ORDER — OLANZAPINE 10 MG/1
10 TABLET, ORALLY DISINTEGRATING ORAL ONCE
Status: COMPLETED | OUTPATIENT
Start: 2020-07-07 | End: 2020-07-07

## 2020-07-07 RX ORDER — LORAZEPAM 2 MG/ML
2 INJECTION INTRAMUSCULAR ONCE
Status: COMPLETED | OUTPATIENT
Start: 2020-07-07 | End: 2020-07-07

## 2020-07-07 RX ORDER — ZIPRASIDONE MESYLATE 20 MG/ML
20 INJECTION, POWDER, LYOPHILIZED, FOR SOLUTION INTRAMUSCULAR ONCE
Status: COMPLETED | OUTPATIENT
Start: 2020-07-07 | End: 2020-07-07

## 2020-07-07 RX ORDER — LORAZEPAM 2 MG/ML
1 INJECTION INTRAMUSCULAR ONCE
Status: COMPLETED | OUTPATIENT
Start: 2020-07-07 | End: 2020-07-07

## 2020-07-07 RX ORDER — LORAZEPAM 2 MG/ML
1 INJECTION INTRAMUSCULAR ONCE
Status: DISCONTINUED | OUTPATIENT
Start: 2020-07-07 | End: 2020-07-07

## 2020-07-07 RX ADMIN — SODIUM CHLORIDE 1000 ML: 0.9 INJECTION, SOLUTION INTRAVENOUS at 19:19

## 2020-07-07 RX ADMIN — SODIUM CHLORIDE 1000 ML: 0.9 INJECTION, SOLUTION INTRAVENOUS at 15:27

## 2020-07-07 RX ADMIN — LORAZEPAM 2 MG: 2 INJECTION INTRAMUSCULAR; INTRAVENOUS at 23:19

## 2020-07-07 RX ADMIN — LORAZEPAM 1 MG: 2 INJECTION INTRAMUSCULAR; INTRAVENOUS at 15:10

## 2020-07-07 RX ADMIN — OLANZAPINE 10 MG: 10 TABLET, ORALLY DISINTEGRATING ORAL at 20:38

## 2020-07-07 RX ADMIN — SODIUM CHLORIDE 1000 ML: 0.9 INJECTION, SOLUTION INTRAVENOUS at 17:45

## 2020-07-07 RX ADMIN — ZIPRASIDONE MESYLATE 20 MG: 20 INJECTION, POWDER, LYOPHILIZED, FOR SOLUTION INTRAMUSCULAR at 14:14

## 2020-07-07 NOTE — ED NOTES
Continual observation taken over by Barak Sage at bedside, patient cooperative       Eber Estrella  07/07/20 1665

## 2020-07-07 NOTE — ED PROVIDER NOTES
History  Chief Complaint   Patient presents with    Overdose - Accidental     patient admits to injecting crystal meth just prior to EMS being called  PSP followed EMS to hospital due to prior issues with this individual  Last time he was tazed by PSP X2  currently he is cooperative  26-year-old male presents to the ED via EMS for evaluation of accidental overdose  Patient reports approximately 30 minutes prior to arrival [place] when he injected crystal meth in the right antecubital region  Patient reports this is 1st time using drugs  Patients only complaint at this time is left hand/wrist injury  He is unsure of injury or trauma to the hand or wrist  Patient reports he is currently struggling at home due to recent separation of his mom and dad  Brendan notes he would like to go to rehab and is willing to due inpatient rehab  Patient had episode of acute psychosis on July 4 and was discharged from the hospital yesterday  Patient was evaluated by Psychiatry and symptoms were thought to be due to electrolyte abnormalities of hypokalemia and hypocalcemia  He was also noted to have elevated CK  Patient had negative drug screen at this time  Head CT was negative for acute findings  Father reports patient has been acting "off his rocker" for the past 4 months  He notes lately it has gotten "out of control " Father denies history of drug use or psychiatric history  Father reports he has been  from patient's mother 7 years  Father reports mother is "crazy" but has no formal psychiatric diagnosis  Notes symptoms started during [place]  Father concerned for psychiatric disorder  Patient denies any suicidal or homicidal ideations  Patient denies visual or auditory hallucinations  None       History reviewed  No pertinent past medical history  History reviewed  No pertinent surgical history  History reviewed  No pertinent family history    I have reviewed and agree with the history as documented  E-Cigarette/Vaping    E-Cigarette Use Never User      E-Cigarette/Vaping Substances    Nicotine No     THC No     CBD No     Flavoring No      Social History     Tobacco Use    Smoking status: Never Smoker    Smokeless tobacco: Never Used   Substance Use Topics    Alcohol use: Yes     Frequency: Monthly or less     Drinks per session: 1 or 2    Drug use: Yes     Types: Methamphetamines       Review of Systems   Reason unable to perform ROS: Patient is answering questions however behavior is bizarre  Constitutional: Negative  HENT: Negative  Eyes: Negative for visual disturbance  Respiratory: Negative for cough, choking, chest tightness, shortness of breath, wheezing and stridor  Cardiovascular: Negative for chest pain, palpitations and leg swelling  Gastrointestinal: Negative  Genitourinary: Negative  Musculoskeletal:        Left hand and wrist pain   Skin: Negative  Neurological: Negative  Psychiatric/Behavioral: Negative for agitation, behavioral problems, confusion, decreased concentration, dysphoric mood, hallucinations, self-injury, sleep disturbance and suicidal ideas  The patient is not nervous/anxious and is not hyperactive  All other systems reviewed and are negative  Physical Exam  Physical Exam   Constitutional: He is oriented to person, place, and time  He appears well-developed and well-nourished  No distress  HENT:   Head: Normocephalic and atraumatic  Head is without raccoon's eyes and without Allan's sign  Right Ear: Hearing, tympanic membrane, external ear and ear canal normal    Left Ear: Hearing, tympanic membrane, external ear and ear canal normal    Nose:       Mouth/Throat: Uvula is midline, oropharynx is clear and moist and mucous membranes are normal    Eyes: Pupils are equal, round, and reactive to light  Conjunctivae and EOM are normal    Neck: Normal range of motion  Neck supple     Cardiovascular: Normal rate and regular rhythm  No murmur heard  Pulmonary/Chest: Effort normal and breath sounds normal  No stridor  No respiratory distress  He has no wheezes  He has no rales  He exhibits no tenderness  Abdominal: Soft  Bowel sounds are normal  He exhibits no distension  There is no tenderness  There is no guarding  Musculoskeletal: Normal range of motion  He exhibits no edema, tenderness or deformity  Lymphadenopathy:     He has no cervical adenopathy  Neurological: He is alert and oriented to person, place, and time  He displays normal reflexes  No sensory deficit  He exhibits normal muscle tone  Coordination normal    Skin: Skin is warm and dry  Capillary refill takes less than 2 seconds  He is not diaphoretic  No erythema  No pallor  Well healing laceration with sutures on bridge of nose  Scratches on bilateral lower extremities  Psychiatric: His mood appears anxious  He is agitated  On arrival patient was relatively relaxed and redirectable however when we attempted to get blood work patient became extremely anxious, agitated, angry aggressive when we attempted intervention  Restraints were used to administer medications   Bizarre behavior - laughing inappropriately   Nursing note and vitals reviewed        Vital Signs  ED Triage Vitals   Temp Pulse Respirations Blood Pressure SpO2   -- 07/07/20 1313 07/07/20 1313 07/07/20 1313 07/07/20 1313    74 18 140/86 100 %      Temp src Heart Rate Source Patient Position - Orthostatic VS BP Location FiO2 (%)   -- 07/07/20 1313 07/07/20 1313 07/07/20 1313 --    Monitor Lying Right arm       Pain Score       07/07/20 1716       No Pain           Vitals:    07/07/20 1800 07/07/20 1830 07/07/20 1900 07/07/20 1909   BP: 118/76 144/76 133/62 133/62   Pulse: 99 78 80 80   Patient Position - Orthostatic VS:             Visual Acuity      ED Medications  Medications   sodium chloride 0 9 % bolus 1,000 mL (1,000 mL Intravenous New Bag 7/7/20 1919)   OLANZapine (ZyPREXA ZYDIS) dispersible tablet 10 mg (has no administration in time range)   sodium chloride 0 9 % bolus 1,000 mL (0 mL Intravenous Stopped 7/7/20 1627)   ziprasidone (GEODON) IM injection 20 mg (20 mg Intramuscular Given 7/7/20 1414)   LORazepam (ATIVAN) injection 1 mg (1 mg Intramuscular Given 7/7/20 1510)   sodium chloride 0 9 % bolus 1,000 mL (0 mL Intravenous Stopped 7/7/20 1845)       Diagnostic Studies  Results Reviewed     Procedure Component Value Units Date/Time    Lactic acid 2 Hours [[id number]] Collected:  07/07/20 1916    Lab Status:  No result Specimen:  Blood from Arm, Right     Rapid drug screen, urine [[id number]]  (Normal) Collected:  07/07/20 1602    Lab Status:  Final result Specimen:  Urine, Clean Catch Updated:  07/07/20 1640     Amph/Meth UR Negative     Barbiturate Ur Negative     Benzodiazepine Urine Negative     Cocaine Urine Negative     Methadone Urine Negative     Opiate Urine Negative     PCP Ur Negative     THC Urine Negative     Oxycodone Urine Negative    Narrative:       FOR MEDICAL PURPOSES ONLY  IF CONFIRMATION NEEDED PLEASE CONTACT THE LAB WITHIN 5 DAYS  Drug Screen Cutoff Levels:  AMPHETAMINE/METHAMPHETAMINES  1000 ng/mL  BARBITURATES     200 ng/mL  BENZODIAZEPINES     200 ng/mL  COCAINE      300 ng/mL  METHADONE      300 ng/mL  OPIATES      300 ng/mL  PHENCYCLIDINE     25 ng/mL  THC       50 ng/mL  OXYCODONE      100 ng/mL    CKMB [[id number]]  (Normal) Collected:  07/07/20 1528    Lab Status:  Final result Specimen:  Blood from Arm, Right Updated:  07/07/20 1638     CK-MB Index <1 0 %      CK-MB 1 1 ng/mL     Novel Coronavirus (Covid-19),PCR [facility] [[id number]]  (Normal) Collected:  07/07/20 1518    Lab Status:  Final result Specimen:  Nares from Nose Updated:  07/07/20 1631     SARS-CoV-2 Negative    Narrative:        The specimen collection materials, transport medium, and/or testing methodology utilized in the production of these test results have been proven to be reliable in a limited validation with an abbreviated program under the Emergency Utilization Authorization provided by the FDA  Testing reported as "Presumptive positive" will be confirmed with secondary testing with a reference laboratory to ensure result accuracy  Clinical caution and judgement should be used with the interpretation of these results with consideration of the clinical impression and other laboratory testing  Testing reported as "Positive" or "Negative" has been proven to be accurate according to standard laboratory validation requirements  All testing is performed with control materials showing appropriate reactivity at standard intervals        Comprehensive metabolic panel [[id number]]  (Abnormal) Collected:  07/07/20 1528    Lab Status:  Final result Specimen:  Blood from Arm, Right Updated:  07/07/20 1603     Sodium 139 mmol/L      Potassium 3 5 mmol/L      Chloride 102 mmol/L      CO2 26 mmol/L      ANION GAP 11 mmol/L      BUN 13 mg/dL      Creatinine 1 42 mg/dL      Glucose 118 mg/dL      Calcium 8 7 mg/dL      AST 41 U/L      ALT 39 U/L      Alkaline Phosphatase 52 U/L      Total Protein 7 5 g/dL      Albumin 4 0 g/dL      Total Bilirubin 0 58 mg/dL      eGFR 70 ml/min/1 73sq m     Narrative:       [place] guidelines for Chronic Kidney Disease (CKD):     Stage 1 with normal or high GFR (GFR > 90 mL/min/1 73 square meters)    Stage 2 Mild CKD (GFR = 60-89 mL/min/1 73 square meters)    Stage 3A Moderate CKD (GFR = 45-59 mL/min/1 73 square meters)    Stage 3B Moderate CKD (GFR = 30-44 mL/min/1 73 square meters)    Stage 4 Severe CKD (GFR = 15-29 mL/min/1 73 square meters)    Stage 5 End Stage CKD (GFR <15 mL/min/1 73 square meters)  Note: GFR calculation is accurate only with a steady state creatinine    Magnesium [[id number]]  (Normal) Collected:  07/07/20 1528    Lab Status:  Final result Specimen:  Blood from Arm, Right Updated:  07/07/20 1603     Magnesium 1 8 mg/dL CK Total with Reflex CKMB [[id number]]  (Abnormal) Collected:  07/07/20 1528    Lab Status:  Final result Specimen:  Blood from Arm, Right Updated:  07/07/20 1601     Total  U/L     Lactic acid [[id number]]  (Abnormal) Collected:  07/07/20 1528    Lab Status:  Final result Specimen:  Blood from Arm, Right Updated:  07/07/20 1558     LACTIC ACID 3 3 mmol/L     Narrative:       Result may be elevated if tourniquet was used during collection  Ethanol [[id number]]  (Normal) Collected:  07/07/20 1528    Lab Status:  Final result Specimen:  Blood from Arm, Right Updated:  07/07/20 1549     Ethanol Lvl <3 mg/dL     CBC and differential [[id number]]  (Abnormal) Collected:  07/07/20 1528    Lab Status:  Final result Specimen:  Blood from Arm, Right Updated:  07/07/20 1535     WBC 5 32 Thousand/uL      RBC 4 63 Million/uL      Hemoglobin 13 5 g/dL      Hematocrit 39 4 %      MCV 85 fL      MCH 29 2 pg      MCHC 34 3 g/dL      RDW 11 5 %      MPV 9 2 fL      Platelets 256 Thousands/uL      nRBC 0 /100 WBCs      Neutrophils Relative 68 %      Immat GRANS % 0 %      Lymphocytes Relative 20 %      Monocytes Relative 9 %      Eosinophils Relative 3 %      Basophils Relative 0 %      Neutrophils Absolute 3 62 Thousands/µL      Immature Grans Absolute 0 02 Thousand/uL      Lymphocytes Absolute 1 06 Thousands/µL      Monocytes Absolute 0 46 Thousand/µL      Eosinophils Absolute 0 14 Thousand/µL      Basophils Absolute 0 02 Thousands/µL                  XR chest 1 view portable   Final Result by Sanford Jones MD (07/07 1437)      No acute cardiopulmonary disease  Workstation performed: [id number]         XR wrist 3+ views LEFT   Final Result by Sanford Jones MD (07/07 1435)      No acute osseous abnormality  Workstation performed: [id number]         XR hand 3+ views LEFT   Final Result by Sanford Jones MD (07/07 1437)      No acute osseous abnormality        Workstation performed: [id number] Procedures  ECG 12 Lead Documentation Only  Date/Time: 7/7/2020 1:17 PM  Performed by: Minh Bobby PA-C  Authorized by: Minh Bobby PA-C     Indications / Diagnosis:  Meth use  ECG reviewed by me, the ED Provider: yes    Patient location:  ED  Interpretation:     Interpretation: normal    Rate:     ECG rate:  72    ECG rate assessment: normal    Rhythm:     Rhythm: sinus rhythm    Ectopy:     Ectopy: none    QRS:     QRS axis:  Right  Conduction:     Conduction: normal    ST segments:     ST segments:  Normal             ED Course  ED Course as of Jul 07 1953 Tue Jul 07, 2020   1317 EKG:  Normal sinus rhythm with sinus arrhythmia, ventricular rate 72 beats per minute, rightward axis, no acute ischemic changes  1337 Phone call made to [facility]      [id number] Patient getting agitated due to attempted to get blood work  Security at bedside  Al Dudley RN at bedside      1451 No acute osseous injury noted on wrist or hand x-ray  Chest x-ray negative for acute cardiopulmonary findings  [phone] Patient continues to be agitated, paranoid  He is refusing any medication at this time  1606 CBC relatively unremarkable  CMP significant for elevated creatinine  [place]  Elevated lactic acid   which is decreased from yesterday  Negative ethanol      1632 COVID negative      1647 Rapid drug screen negative   Patient is more relaxed and controlled at this time  Plan to hydrate and re-check labs with evaluation by psychiatry when medically cleared  Father of patient agreeable with this pain       1758 Due to patient reporting use of LSD and marijuana will have patient speak with [facility]  Discussed with laboratory we are unable to run LSD test in house  Henrry 18 from [facility] reports that patient continues to be receptive of rehab  She reports patient admitted to using PCP, marijuana and meth  36 Patient's father was updated on patient's current status    Patient's father reiterates he does not feel as if symptoms are due to drug use  1945 Case discussed and reviewed with Dr Andrew Todd treatment plan to continued IV fluids and recheck Lactic acid and CMP  When patient is medically cleared plan to have patient be seen by Psychiatry  Additionally, [facility] currently searching for rehab facility  US AUDIT      Most Recent Value   Initial Alcohol Screen: US AUDIT-C    1  How often do you have a drink containing alcohol? 1 Filed at: 07/07/2020 1314   2  How many drinks containing alcohol do you have on a typical day you are drinking? 1 Filed at: 07/07/2020 1314   3a  Male UNDER 65: How often do you have five or more drinks on one occasion? 1 Filed at: 07/07/2020 1314   Audit-C Score  3 Filed at: 07/07/2020 1314                  LAINE/DAST-10      Most Recent Value   How many times in the past year have you    Used an illegal drug or used a prescription medication for non-medical reasons? Monthly Filed at: 07/07/2020 1315   In the past 12 months      1  Have you used drugs other than those required for medical reasons? 1 Filed at: 07/07/2020 1315   2  Do you use more than one drug at a time? 0 Filed at: 07/07/2020 1315   3  Have you had medical problems as a result of your drug use (e g , memory loss, hepatitis, convulsions, bleeding, etc )? 0 Filed at: 07/07/2020 1315   4  Have you had "blackouts" or "flashbacks" as a result of drug use? YesNo  1 Filed at: 07/07/2020 1315   5  Do you ever feel bad or guilty about your drug use? 1 Filed at: 07/07/2020 1315   6  Does your spouse (or parent) ever complain about your involvement with drugs? 1 Filed at: 07/07/2020 1315   7  Have you neglected your family because of your use of drugs? 0 Filed at: 07/07/2020 1315   8  Have you engaged in illegal activities in order to obtain drugs? 0 Filed at: 07/07/2020 1315   9  Have you ever experienced withdrawal symptoms (felt sick) when you stopped taking drugs?   0 Filed at: 07/07/2020 1315   10  Are you always able to stop using drugs when you want to?  0 Filed at: 07/07/2020 1315   DAST-10 Score  (!) 4 Filed at: 07/07/2020 1315                                MDM      Disposition  Final diagnoses:   None     ED Disposition     None      Follow-up Information    None         Patient's Medications    No medications on file     No discharge procedures on file      PDMP Review     None          ED Provider  Electronically Signed by           Sudha Lyons PA-C  07/07/20 1952

## 2020-07-07 NOTE — ED ATTENDING ATTESTATION
7/7/2020  Robyn Tenorio DO, saw and evaluated the patient  I have discussed the patient with the resident/non-physician practitioner and agree with the resident's/non-physician practitioner's findings, Plan of Care, and MDM as documented in the resident's/non-physician practitioner's note, except where noted  All available labs and Radiology studies were reviewed  I was present for key portions of any procedure(s) performed by the resident/non-physician practitioner and I was immediately available to provide assistance  At this point I agree with the current assessment done in the Emergency Department  I have conducted an independent evaluation of this patient a history and physical is as follows:    ED Course  ED Course as of Jul 08 0952   Tue Jul 07, 2020   1650 Patient calmer at this time  Have removed most restraints  Will continue IV rehydration  Will need to recheck labs  Patient will need to be re-evaluated by Mental Health/psychiatry  36 Additional conversation with the patient and he now relates that he earlier used a type of vape disk that contains LSD and marijuana  He remains relatively calm, albeit slightly paranoid in his affect and has currently tolerated PO intake  This is a 26-year-old male presents emergency room with acute psychosis which is presumed to be secondary to a drug overdose based on previous history  Patient was however recently admitted to the hospital for acute kidney injury and had at that time a negative urine drug screen  During that admission it was documented that the patient had admitted to using LSD  He was seen in consultation with Psychiatry who felt that his primary issues were related to his metabolic disorder    Patient initially presents emergency room relatively redirectable and following the direction of medical personnel    However, once the initiation of intervention and evaluation began patient became difficult to redirect and quickly escalated to be coming verbally and physically threatening  Patient was clearly a danger to others and interfering with care and subsequently required both physical and chemical restraints  Will continue along with evaluation of the patient now the patient is more able to participate in this evaluation      Father has been present at bedside and has been kept informed    Please see PAs note    Diagnosis  Acute psychosis  Substance use disorder

## 2020-07-07 NOTE — ED NOTES
Patient increasingly getting agitated and unwilling to let us draw blood from him or perform any procedures  Patient medicated with geodon and placed in 4 point locked restraints with security at bedside         Zachery Lombard, RN  07/07/20 0927

## 2020-07-08 VITALS
HEART RATE: 76 BPM | WEIGHT: 184.3 LBS | HEIGHT: 68 IN | SYSTOLIC BLOOD PRESSURE: 160 MMHG | RESPIRATION RATE: 18 BRPM | DIASTOLIC BLOOD PRESSURE: 77 MMHG | OXYGEN SATURATION: 100 % | BODY MASS INDEX: 27.93 KG/M2 | TEMPERATURE: 97.7 F

## 2020-07-08 LAB
ATRIAL RATE: 72 BPM
ATRIAL RATE: 92 BPM
P AXIS: 68 DEGREES
P AXIS: 69 DEGREES
PR INTERVAL: 142 MS
PR INTERVAL: 148 MS
QRS AXIS: 91 DEGREES
QRS AXIS: 92 DEGREES
QRSD INTERVAL: 86 MS
QRSD INTERVAL: 86 MS
QT INTERVAL: 354 MS
QT INTERVAL: 408 MS
QTC INTERVAL: 437 MS
QTC INTERVAL: 446 MS
T WAVE AXIS: -4 DEGREES
T WAVE AXIS: 32 DEGREES
VENTRICULAR RATE: 72 BPM
VENTRICULAR RATE: 92 BPM

## 2020-07-08 PROCEDURE — 93010 ELECTROCARDIOGRAM REPORT: CPT | Performed by: INTERNAL MEDICINE

## 2020-07-08 RX ORDER — ACETAMINOPHEN 325 MG/1
650 TABLET ORAL ONCE
Status: COMPLETED | OUTPATIENT
Start: 2020-07-08 | End: 2020-07-08

## 2020-07-08 RX ADMIN — ACETAMINOPHEN 650 MG: 325 TABLET ORAL at 04:25

## 2020-07-08 NOTE — ED NOTES
Patient requesting to shower  IV site wrapped in tegaderm and gauze to avoid moisture  Patient provided with toiletries, clean scrubs, underwear, and socks  Patient ambulatory to Room 1 to use shower  1:1 remains with patient, security aware and outside room  Bed linens also changed, white board updated          Marlen Mcdaniel RN  07/07/20 8008

## 2020-07-08 NOTE — ED NOTES
Spoke with vijay at [employer]  States ride should arrive between 1130 and 1330   Patient updated and lunch ordered     Ancelmo Driver RN  07/08/20 5667

## 2020-07-08 NOTE — ED NOTES
Pt father would like to be updated with patient information      [facility] Vani Orozco RN  [phone]

## 2020-07-08 NOTE — ED NOTES
Assumed care of pt  Pt calm and cooperative with a sitter  Ambulating around the unit, carson Bryant RN  07/08/20 1923

## 2020-07-08 NOTE — ED NOTES
Patient to be transported to [facility] tomorrow at 1100 by [facility] transport team  Patient lab values faxed to [facility] at [phone]       Trinh King RN  07/07/20 8376

## 2020-07-08 NOTE — ED NOTES
Pt changed back into his clothes  ivr removed  Pt calm and cooperative   Sitter at [facility]  07/08/20 0135

## 2020-07-09 ENCOUNTER — HOSPITAL ENCOUNTER (EMERGENCY)
Facility: HOSPITAL | Age: 20
End: 2020-07-10
Attending: EMERGENCY MEDICINE
Payer: COMMERCIAL

## 2020-07-09 ENCOUNTER — HOSPITAL ENCOUNTER (EMERGENCY)
Facility: HOSPITAL | Age: 20
Discharge: HOME | End: 2020-07-09
Attending: EMERGENCY MEDICINE | Admitting: EMERGENCY MEDICINE
Payer: COMMERCIAL

## 2020-07-09 VITALS
WEIGHT: 165 LBS | HEIGHT: 67 IN | BODY MASS INDEX: 25.9 KG/M2 | HEART RATE: 86 BPM | OXYGEN SATURATION: 96 % | TEMPERATURE: 97.9 F | DIASTOLIC BLOOD PRESSURE: 79 MMHG | SYSTOLIC BLOOD PRESSURE: 151 MMHG | RESPIRATION RATE: 16 BRPM

## 2020-07-09 VITALS
HEART RATE: 88 BPM | RESPIRATION RATE: 20 BRPM | DIASTOLIC BLOOD PRESSURE: 87 MMHG | OXYGEN SATURATION: 98 % | TEMPERATURE: 97.8 F | SYSTOLIC BLOOD PRESSURE: 140 MMHG

## 2020-07-09 DIAGNOSIS — F29 PSYCHOSIS (HCC): Primary | ICD-10-CM

## 2020-07-09 DIAGNOSIS — Z00.8 ENCOUNTER FOR PSYCHOLOGICAL EVALUATION: Primary | ICD-10-CM

## 2020-07-09 DIAGNOSIS — R45.1 AGITATION: Primary | ICD-10-CM

## 2020-07-09 LAB
AMPHETAMINES SERPL QL SCN: NEGATIVE
BARBITURATES UR QL: NEGATIVE
BENZODIAZ UR QL: NEGATIVE
COCAINE UR QL: NEGATIVE
METHADONE UR QL: NEGATIVE
OPIATES UR QL SCN: NEGATIVE
OXYCODONE+OXYMORPHONE UR QL SCN: NEGATIVE
PCP UR QL: NEGATIVE
THC UR QL: NEGATIVE

## 2020-07-09 PROCEDURE — 99282 EMERGENCY DEPT VISIT SF MDM: CPT | Performed by: PHYSICIAN ASSISTANT

## 2020-07-09 PROCEDURE — 99283 EMERGENCY DEPT VISIT LOW MDM: CPT

## 2020-07-09 PROCEDURE — 80307 DRUG TEST PRSMV CHEM ANLYZR: CPT

## 2020-07-09 PROCEDURE — 99284 EMERGENCY DEPT VISIT MOD MDM: CPT

## 2020-07-09 PROCEDURE — 96372 THER/PROPH/DIAG INJ SC/IM: CPT

## 2020-07-09 PROCEDURE — 99282 EMERGENCY DEPT VISIT SF MDM: CPT | Performed by: EMERGENCY MEDICINE

## 2020-07-09 RX ORDER — LORAZEPAM 1 MG/1
1 TABLET ORAL ONCE
Status: COMPLETED | OUTPATIENT
Start: 2020-07-09 | End: 2020-07-09

## 2020-07-09 RX ORDER — CETIRIZINE HYDROCHLORIDE 10 MG/1
10 TABLET ORAL DAILY
COMMUNITY

## 2020-07-09 RX ORDER — OLANZAPINE 10 MG/1
10 INJECTION, POWDER, LYOPHILIZED, FOR SOLUTION INTRAMUSCULAR ONCE
Status: COMPLETED | OUTPATIENT
Start: 2020-07-09 | End: 2020-07-09

## 2020-07-09 RX ADMIN — OLANZAPINE 10 MG: 10 INJECTION, POWDER, FOR SOLUTION INTRAMUSCULAR at 21:55

## 2020-07-09 RX ADMIN — LORAZEPAM 1 MG: 1 TABLET ORAL at 19:07

## 2020-07-09 NOTE — ED NOTES
Pt walked outside, directed back inside to room #10 by security and BETITO Jacobsen RN  07/09/20 1535

## 2020-07-09 NOTE — ED PROVIDER NOTES
History  Chief Complaint   Patient presents with    Psychiatric Evaluation     EMS brings pt from [facility] for cocaine rehab for exercising outside and talking to may Carpenter SI/HI, denies drugs/ETOH today  Patient is a 22 y/o M BIBA from [facility] for bizarre behavior  Patient states he was outside exercising and felt dehydrated so he started drinking water  He states staff called the ambulance because they thought he was acting bizarre  Patient states he was hot from working out and felt dizzy, but feels better now  He denies SI/HI  He denies recent drug abuse  Patient is currently at [facility] for cocaine abuse  He has an abrasion to his nose which he states happened at a party prior to his admission to [facility]  Patient refusing blood work because he states it is against his [employer]  No staff from [facility] present  History provided by:  Patient  Psychiatric Evaluation   Presenting symptoms: agitation    Presenting symptoms: no hallucinations, no homicidal ideas and no suicidal thoughts    Patient accompanied by:  Law enforcement  Onset quality:  Sudden  Duration:  1 day  Timing:  Constant  Progression:  Resolved  Chronicity:  New  Context: drug abuse (h/o cocaine abuse, denies recent  )    Relieved by:  Nothing  Worsened by:  Nothing  Ineffective treatments:  None tried  Associated symptoms: anxiety    Associated symptoms: no chest pain and no irritability        Prior to Admission Medications   Prescriptions Last Dose Informant Patient Reported? Taking? cetirizine (ZyrTEC) 10 mg tablet   Yes Yes   Sig: Take 10 mg by mouth daily      Facility-Administered Medications: None       History reviewed  No pertinent past medical history  History reviewed  No pertinent surgical history  History reviewed  No pertinent family history  I have reviewed and agree with the history as documented      E-Cigarette/Vaping    E-Cigarette Use Never User      E-Cigarette/Vaping Substances     Social History     Tobacco Use    Smoking status: Never Smoker    Smokeless tobacco: Never Used   Substance Use Topics    Alcohol use: Never     Frequency: Never    Drug use: Not Currently     Types: Cocaine     Comment: cocaine x 1       Review of Systems   Constitutional: Negative for chills, fever and irritability  Respiratory: Negative for cough and shortness of breath  Cardiovascular: Negative for chest pain  Gastrointestinal: Negative for abdominal distention, diarrhea, nausea and vomiting  Genitourinary: Negative for dysuria  Skin: Negative for color change and rash  Neurological: Negative for dizziness, weakness and numbness  Psychiatric/Behavioral: Positive for agitation  Negative for confusion, hallucinations, homicidal ideas and suicidal ideas  The patient is nervous/anxious  All other systems reviewed and are negative  Physical Exam  Physical Exam   Constitutional: He is oriented to person, place, and time  He appears well-developed and well-nourished  He is cooperative  He does not appear ill  No distress  HENT:   Head: Normocephalic and atraumatic  Nose: Nose normal    Eyes: Pupils are equal, round, and reactive to light  Conjunctivae are normal    Neck: Normal range of motion  Cardiovascular: Normal rate, regular rhythm and normal heart sounds  Pulmonary/Chest: Effort normal and breath sounds normal  He has no wheezes  He has no rhonchi  He has no rales  Abdominal: Soft  Normal appearance and bowel sounds are normal  There is no tenderness  Musculoskeletal: Normal range of motion  He exhibits no edema  Neurological: He is alert and oriented to person, place, and time  He has normal strength  No sensory deficit  Skin: Skin is warm and dry  No rash noted  He is not diaphoretic  No pallor  Psychiatric: He has a normal mood and affect  His speech is normal  Cognition and memory are normal    Nursing note and vitals reviewed        Vital Signs  ED Triage Vitals [07/09/20 1421]   Temperature Pulse Respirations Blood Pressure SpO2   97 8 °F (36 6 °C) 88 20 140/87 98 %      Temp Source Heart Rate Source Patient Position - Orthostatic VS BP Location FiO2 (%)   Temporal Monitor Sitting Right arm --      Pain Score       --           Vitals:    07/09/20 1421   BP: 140/87   Pulse: 88   Patient Position - Orthostatic VS: Sitting         Visual Acuity      ED Medications  Medications - No data to display    Diagnostic Studies  Results Reviewed     None                 No orders to display              Procedures  Procedures         ED Course       US AUDIT      Most Recent Value   Initial Alcohol Screen: US AUDIT-C    1  How often do you have a drink containing alcohol? 2 Filed at: 07/09/2020 1421   2  How many drinks containing alcohol do you have on a typical day you are drinking? 1 Filed at: 07/09/2020 1421   3a  Male UNDER 65: How often do you have five or more drinks on one occasion? 0 Filed at: 07/09/2020 1421   3b  FEMALE Any Age, or MALE 65+: How often do you have 4 or more drinks on one occassion? 0 Filed at: 07/09/2020 1421   Audit-C Score  3 Filed at: 07/09/2020 1421                  LAINE/DAST-10      Most Recent Value   How many times in the past year have you    Used an illegal drug or used a prescription medication for non-medical reasons? Once or Twice Filed at: 07/09/2020 1421   In the past 12 months      1  Have you used drugs other than those required for medical reasons? 1 Filed at: 07/09/2020 1421   2  Do you use more than one drug at a time? 0 Filed at: 07/09/2020 1421   3  Have you had medical problems as a result of your drug use (e g , memory loss, hepatitis, convulsions, bleeding, etc )? 0 Filed at: 07/09/2020 1421   4  Have you had "blackouts" or "flashbacks" as a result of drug use? YesNo  0 Filed at: 07/09/2020 1421   5  Do you ever feel bad or guilty about your drug use? 0 Filed at: 07/09/2020 1421   6   Does your spouse (or parent) ever complain about your involvement with drugs? 0 Filed at: 07/09/2020 1421   7  Have you neglected your family because of your use of drugs? 0 Filed at: 07/09/2020 1421   8  Have you engaged in illegal activities in order to obtain drugs? 0 Filed at: 07/09/2020 1421   9  Have you ever experienced withdrawal symptoms (felt sick) when you stopped taking drugs? 0 Filed at: 07/09/2020 1421   10  Are you always able to stop using drugs when you want to?  0 Filed at: 07/09/2020 1421   DAST-10 Score  1 Filed at: 07/09/2020 1421                                MDM  Number of Diagnoses or Management Options  Agitation: established and improving  Diagnosis management comments: Patient sent here for psych evaluation  Patient refusing treatment and denies HI/SI  He is refusing labs because he states it is against his Mandaen  Staff from [facility] and police officers did not petition for 36  He is alert and oriented, no acting bizarre and would like to leave, will discharge patient  Patient Progress  Patient progress: stable        Disposition  Final diagnoses:   Agitation     Time reflects when diagnosis was documented in both MDM as applicable and the Disposition within this note     Time User Action Codes Description Comment    7/9/2020  2:38 PM Hu Henry [R45 1] Agitation       ED Disposition     ED Disposition Condition Date/Time Comment    Discharge Stable Thu Jul 9, 2020  2:38 PM Jermaine Raya discharge to home/self care  Follow-up Information     Follow up With Specialties Details Why 1 Jay Jay Velasquez MD Family Medicine Call in 1 week For recheck [address]  [facility] [address]  [phone]            Discharge Medication List as of 7/9/2020  2:38 PM      CONTINUE these medications which have NOT CHANGED    Details   cetirizine (ZyrTEC) 10 mg tablet Take 10 mg by mouth daily, Historical Med           No discharge procedures on file      PDMP Review None          ED Provider  Electronically Signed by           Tom Vidal PA-C  07/09/20 0066

## 2020-07-09 NOTE — ED NOTES
Return call received from [facility], they are sending transport to pick patient up from ER  Pt made aware of same       Sudha Larry RN  07/09/20 0918

## 2020-07-09 NOTE — ED NOTES
Pt AAOx4  Sitting on stretcher  Meal tray provided  Pt cooperative  States that he sleep walks and has since he was young  No s/s distress  Call bell in [place], BETITO  07/09/20 8951

## 2020-07-09 NOTE — ED NOTES
Call received from [facility]  States they want pt to have urine drug screen and psych eval  Pt AAOx4  Cooperative  Discussed same with Dr Meliton Mcintyre  Per Dr Meliton Mcintyre, pt was assessed and evaluated and is now discharged  [facility] states they will call ER back after they clear pt's readmission to [facility] with their doctors        Angelia Lynch RN  07/09/20 0519

## 2020-07-09 NOTE — ED PROVIDER NOTES
History  Chief Complaint   Patient presents with    Psychiatric Evaluation     pt states he was sleep walking at [facility] and they sent him for an eval, pt states he has slept walked since he was young     24 yo M BIBA from [facility] for evaluation of altered mental status  Apparently he was sleep walking (which he has a history of in past) and he wasn't responding to staff so they called 911  Pt is awake, alert, oriented and has no complaints  Has only been at [facility] for a short time, hasn't been eval'd by physician there yet  At rehab for cocaine use, last used July 4  History provided by:  Patient and medical records   used: No    Altered Mental Status   Presenting symptoms: no confusion    Severity:  Mild  Most recent episode: Today  Episode history:  Single  Progression:  Resolved  Chronicity:  Recurrent  Context: not recent illness and not recent infection    Associated symptoms: no abdominal pain, normal movement, no agitation, no bladder incontinence, no decreased appetite, no depression, no difficulty breathing, no fever, no hallucinations, no headaches, no light-headedness, no nausea, no palpitations, no rash, no seizures, no slurred speech, no suicidal behavior, no visual change, no vomiting and no weakness        None       No past medical history on file  No past surgical history on file  No family history on file  I have reviewed and agree with the history as documented  E-Cigarette/Vaping    E-Cigarette Use Never User      E-Cigarette/Vaping Substances     Social History     Tobacco Use    Smoking status: Never Smoker    Smokeless tobacco: Never Used   Substance Use Topics    Alcohol use: Never     Frequency: Never    Drug use: Not Currently     Comment: cocaine x 1       Review of Systems   Constitutional: Negative for chills, decreased appetite, diaphoresis, fatigue, fever and unexpected weight change     HENT: Negative for congestion, ear pain, rhinorrhea, sore throat, trouble swallowing and voice change  Eyes: Negative for pain and visual disturbance  Respiratory: Negative for cough, chest tightness and shortness of breath  Cardiovascular: Negative for chest pain, palpitations and leg swelling  Gastrointestinal: Negative for abdominal pain, blood in stool, constipation, diarrhea, nausea and vomiting  Genitourinary: Negative for bladder incontinence, difficulty urinating and hematuria  Musculoskeletal: Negative for arthralgias, back pain and neck pain  Skin: Negative for rash  Neurological: Negative for dizziness, seizures, syncope, weakness, light-headedness and headaches  Psychiatric/Behavioral: Negative for agitation, confusion, hallucinations and suicidal ideas  The patient is not nervous/anxious  Physical Exam  Physical Exam   Constitutional: He is oriented to person, place, and time  He appears well-developed and well-nourished  No distress  HENT:   Head: Normocephalic and atraumatic  Right Ear: External ear normal    Left Ear: External ear normal    Nose: Nose normal    Mouth/Throat: Oropharynx is clear and moist    Eyes: Pupils are equal, round, and reactive to light  Conjunctivae and EOM are normal  Right eye exhibits no discharge  Left eye exhibits no discharge  No scleral icterus  Neck: Normal range of motion  Neck supple  No JVD present  No tracheal deviation present  Cardiovascular: Normal rate, regular rhythm, normal heart sounds and intact distal pulses  Exam reveals no gallop and no friction rub  No murmur heard  Pulmonary/Chest: Effort normal and breath sounds normal  No stridor  No respiratory distress  He has no wheezes  He has no rales  He exhibits no tenderness  Abdominal: Soft  Bowel sounds are normal  He exhibits no distension  There is no tenderness  There is no rebound and no guarding  Musculoskeletal: Normal range of motion  He exhibits no edema, tenderness or deformity     Lymphadenopathy: He has no cervical adenopathy  Neurological: He is alert and oriented to person, place, and time  No cranial nerve deficit or sensory deficit  Coordination normal    Skin: Skin is warm and dry  No rash noted  He is not diaphoretic  Superficial abrasion on bridge of nose, and R 3rd toe  These are several days old, not actively bleeding, and do not require repair  No sign of infection  Psychiatric: He has a normal mood and affect  His behavior is normal  Judgment and thought content normal  Cognition and memory are normal    Nursing note and vitals reviewed  Vital Signs  ED Triage Vitals [07/09/20 0541]   Temperature Pulse Respirations Blood Pressure SpO2   97 9 °F (36 6 °C) 86 16 (!) 151/79 96 %      Temp src Heart Rate Source Patient Position - Orthostatic VS BP Location FiO2 (%)   -- -- -- -- --      Pain Score       --           Vitals:    07/09/20 0541   BP: (!) 151/79   Pulse: 86         Visual Acuity      ED Medications  Medications - No data to display    Diagnostic Studies  Results Reviewed     None                 No orders to display              Procedures  Procedures         ED Course  ED Course as of Jul 09 0602   Th Jul 09, 2020   0550 Pt is awake, alert, oriented and has no complaints  Denies SI/HI  States h/o sleepwalking since childhood  No fall/trauma  Will d/c back to [facility]  Discussed w/ pt's father, Juan Kuo over the phone as well, with Federico's permission  KAMILA      Most Recent Value   During the past 12 months, did you:   1  Drink any alcohol (more than a few sips)? No Filed at: 07/09/2020 0542   2  Smoke any marijuana or hashish  No Filed at: 07/09/2020 0542   3  Use anything else to get high? ("anything else" includes illegal drugs, over the counter and prescription drugs, and things that you sniff or 'minaya')?   No Filed at: 07/09/2020 0542                                        MDM  Number of Diagnoses or Management Options  Encounter for psychological evaluation: new and does not require workup     Amount and/or Complexity of Data Reviewed  Review and summarize past medical records: yes    Risk of Complications, Morbidity, and/or Mortality  Presenting problems: low  Diagnostic procedures: minimal  Management options: minimal    Patient Progress  Patient progress: improved        Disposition  Final diagnoses:   Encounter for psychological evaluation     Time reflects when diagnosis was documented in both MDM as applicable and the Disposition within this note     Time User Action Codes Description Comment    7/9/2020  5:50 AM Kandi Lantigua Add [Z00 8] Encounter for psychological evaluation       ED Disposition     ED Disposition Condition Date/Time Comment    Discharge Stable u Jul 9, 2020  5:50 AM Lowell Segura discharge to home/self care  Follow-up Information     Follow up With Specialties Details Why Contact Info Additional Phyllis Morales [address] Emergency Medicine  If symptoms worsen [address] [place], [id number] [phone]  ED, [address] [place] [address], [facility] [address]          Patient's Medications    No medications on file     No discharge procedures on file      PDMP Review     None          ED Provider  Electronically Signed by           Jose Hurtado MD  07/09/20 7015

## 2020-07-09 NOTE — ED NOTES
Patient is resting comfortably in stretcher, given warm blanket  Will continue to monitor        Micah Biggs RN  07/09/20 5392

## 2020-07-10 VITALS
WEIGHT: 184 LBS | SYSTOLIC BLOOD PRESSURE: 125 MMHG | OXYGEN SATURATION: 98 % | TEMPERATURE: 98.1 F | BODY MASS INDEX: 27.89 KG/M2 | HEIGHT: 68 IN | DIASTOLIC BLOOD PRESSURE: 67 MMHG | RESPIRATION RATE: 18 BRPM | HEART RATE: 88 BPM

## 2020-07-10 PROCEDURE — 99284 EMERGENCY DEPT VISIT MOD MDM: CPT | Performed by: EMERGENCY MEDICINE

## 2020-07-10 NOTE — ED CARE HANDOFF
Emergency Department Sign Out Note        Sign out and transfer of care from Nadya Castro  See Separate Emergency Department note  The patient, Fabio Cuadra, was evaluated by the previous provider for crisis evaluation  Workup Completed:  Cleared medically    ED Course / Workup Pending (followup):  Evaluated by crisis at this time patient is calm cooperative interactive denies any suicidal ideations was evaluated by Sujey  from crisis who does not feel that patient needs inpatient criteria will give him outpatient follow-up                             Procedures  MDM    Disposition  Final diagnoses:   None     ED Disposition     None      Follow-up Information    None       Patient's Medications    No medications on file     No discharge procedures on file         ED Provider  Electronically Signed by     Britta Quintanilla DO  07/10/20 1120

## 2020-07-10 NOTE — SOCIAL WORK
MIK received a call from pts grandmother, who sts she would like a call back , sts pt was admitted to [facility] but "things did work out"    CM called grandmother X3 back at number left on , [phone], unable to get through, message [facility] "person at this number is not receiving calls at this time "

## 2020-07-10 NOTE — ED NOTES
Father at bedside  States he wants pt to stay and get mental health evaluation  Pt currently laying on floor beneath sink responding to internal stimuli  Will continue to monitor         Darnell Corrales RN  07/09/20 0919

## 2020-07-10 NOTE — ED NOTES
Patient woke up from sleeping  Used the bathroom  Patient directed back into room  Patient answered all orientation questions  Patient states that the reason he is here is because he was running and fell  When patient was asked why he was running, he said because he felt like it  Patient denies SI/HI  States that he is tired and wants to go back to sleep   RN ordered patient breakfast  Notified [facility] of psych consult     Willie Martin RN  07/10/20 0968

## 2020-07-10 NOTE — ED PROVIDER NOTES
History  Chief Complaint   Patient presents with    Psychiatric Evaluation     Pt arrives with police after he was running around and father requested pt to be taken to ER  Pt denies SI/HI     29-year-old male presents for altered mental status  The patient has been to multiple emergency departments over the past week  According to father, the patient was a normal person until COVID hit  He has been acting more erratically recently  Reported the patient was naked in the barn and had to be taste by multiple police officers  The patient then was brought to the emergency department was admitted for an OPLINA and was seen by Psychiatry and he was reportedly admitting to using multiple different drugs  Patient then went home and then went back to the emergency department and sent to  But after a similar incidents  He states that he uses cocaine crystal meth LSD ecstasy marijuana yet none of his urine drug screens have been positive  Patient was supposed to be in rehab although apparently he ran away and was acting erratically in brought here by police  The patient is clearly psychotic talking about he loves life, states that he does all types of drugs, but then states that he does not do any drugs  He has pressured speech  Inappropriate laughing  Responding to internal stimuli    Assessment plan:  Discussed with the patient's fathe, is entirely plausible the patient is just making up that he is using drugs and that this is purely psychiatric  The patient is currently lying on the floor of the emergency department and not responding to any stimuli externally and appears to be a having a blank stare, we will keep the patient in the emergency department because I do feel that the patient is a possible danger to himself as he clearly is exhibiting psychotic features  We will give him Zyprexa and keep him to be evaluated by Psychiatry in the morning              None       History reviewed   No pertinent past medical history  History reviewed  No pertinent surgical history  History reviewed  No pertinent family history  I have reviewed and agree with the history as documented  E-Cigarette/Vaping    E-Cigarette Use Never User      E-Cigarette/Vaping Substances    Nicotine No     THC No     CBD No     Flavoring No      Social History     Tobacco Use    Smoking status: Never Smoker    Smokeless tobacco: Never Used   Substance Use Topics    Alcohol use: Yes     Frequency: Monthly or less     Drinks per session: 1 or 2    Drug use: Yes     Types: Methamphetamines       Review of Systems   Unable to perform ROS: Psychiatric disorder       Physical Exam  Physical Exam   Constitutional: He appears well-developed  HENT:   Head: Normocephalic and atraumatic  Right Ear: External ear normal    Left Ear: External ear normal    Mouth/Throat: Oropharynx is clear and moist    Eyes: Pupils are equal, round, and reactive to light  Conjunctivae and EOM are normal    Neck: Normal range of motion  Neck supple  No JVD present  No thyromegaly present  Cardiovascular: Regular rhythm and normal heart sounds  Exam reveals no gallop and no friction rub  No murmur heard  Pulmonary/Chest: Effort normal and breath sounds normal  No respiratory distress  He has no wheezes  He has no rales  Abdominal: Soft  Bowel sounds are normal  He exhibits no distension  There is no rebound and no guarding  Musculoskeletal: Normal range of motion  He exhibits no edema  Lymphadenopathy:     He has no cervical adenopathy  Neurological: He is alert  He is disoriented  No cranial nerve deficit  Psychiatric: His mood appears anxious  His affect is labile and inappropriate  He is agitated, hyperactive and combative  Cognition and memory are impaired  He exhibits abnormal recent memory  Nursing note and vitals reviewed        Vital Signs  ED Triage Vitals [07/09/20 1854]   Temperature Pulse Respirations Blood Pressure SpO2   98 °F (36 7 °C) (!) 108 20 140/80 99 %      Temp Source Heart Rate Source Patient Position - Orthostatic VS BP Location FiO2 (%)   Temporal Monitor Sitting Left arm --      Pain Score       --           Vitals:    07/09/20 2100 07/10/20 0425 07/10/20 0724 07/10/20 1008   BP: 112/74 142/71 129/76 125/67   Pulse: 71 79 67 88   Patient Position - Orthostatic VS: Sitting Lying Sitting          Visual Acuity  Visual Acuity      Most Recent Value   L Pupil Size (mm)  3   R Pupil Size (mm)  3          ED Medications  Medications   LORazepam (ATIVAN) tablet 1 mg (1 mg Oral Given 7/9/20 1907)   OLANZapine (ZyPREXA) IM injection 10 mg (10 mg Intramuscular Given 7/9/20 2155)       Diagnostic Studies  Results Reviewed     Procedure Component Value Units Date/Time    Rapid drug screen, urine [[id number]]  (Normal) Collected:  07/09/20 2155    Lab Status:  Final result Specimen:  Urine, Clean Catch Updated:  07/09/20 2219     Amph/Meth UR Negative     Barbiturate Ur Negative     Benzodiazepine Urine Negative     Cocaine Urine Negative     Methadone Urine Negative     Opiate Urine Negative     PCP Ur Negative     THC Urine Negative     Oxycodone Urine Negative    Narrative:       FOR MEDICAL PURPOSES ONLY  IF CONFIRMATION NEEDED PLEASE CONTACT THE LAB WITHIN 5 DAYS  Drug Screen Cutoff Levels:  AMPHETAMINE/METHAMPHETAMINES  1000 ng/mL  BARBITURATES     200 ng/mL  BENZODIAZEPINES     200 ng/mL  COCAINE      300 ng/mL  METHADONE      300 ng/mL  OPIATES      300 ng/mL  PHENCYCLIDINE     25 ng/mL  THC       50 ng/mL  OXYCODONE      100 ng/mL                 No orders to display              Procedures  Procedures         ED Course       US AUDIT      Most Recent Value   Initial Alcohol Screen: US AUDIT-C    1  How often do you have a drink containing alcohol? 1 Filed at: 07/09/2020 1855   2  How many drinks containing alcohol do you have on a typical day you are drinking? 1 Filed at: 07/09/2020 1855   3a  Male UNDER 65:  How often do you have five or more drinks on one occasion? 0 Filed at: 07/09/2020 1855   3b  FEMALE Any Age, or MALE 65+: How often do you have 4 or more drinks on one occassion? 0 Filed at: 07/09/2020 1855   Audit-C Score  2 Filed at: 07/09/2020 1855                  LAINE/DAST-10      Most Recent Value   How many times in the past year have you    Used an illegal drug or used a prescription medication for non-medical reasons? Never Filed at: 07/09/2020 1855                                MDM  Number of Diagnoses or Management Options  Psychosis [facility]): new and requires workup  Diagnosis management comments: Patient will be evaluated by psych in morning to determine psych admission       Amount and/or Complexity of Data Reviewed  Clinical lab tests: ordered and reviewed  Tests in the medicine section of CPT®: ordered and reviewed  Obtain history from someone other than the patient: yes  Review and summarize past medical records: yes          Disposition  Final diagnoses:   Psychosis ([address] )     Time reflects when diagnosis was documented in both MDM as applicable and the Disposition within this note     Time User Action Codes Description Comment    7/10/2020 11:27 AM Kalie Carrion Add [F29] Psychosis [facility])       ED Disposition     ED Disposition Condition Date/Time Comment    Discharge Stable Fri Jul 10, 2020 11:27 AM Ashley Johnson discharge to home/self care  Follow-up Information     Follow up With Specialties Details Why 1 Jay Jay Velasquez MD Family Medicine In 1 week  [address] [address]  [address] In 1 week  [address] [place] [address]  [phone]            There are no discharge medications for this patient  No discharge procedures on file      PDMP Review     None          ED Provider  Electronically Signed by           Víctor Silver DO  07/11/20 0112

## 2020-07-10 NOTE — ED NOTES
Unable to speak with patient earlier because he was lying in the floor sliding out into the alaniz repeatedly and refusing to answer, though obviously was aware of what was going on around him as when he overheard a conversation about a zyprexa injection he slid himself from the floor in the hallway where he had been scooting backward back into his room  Another time when he was laying on the floor beneath the sink in the room cw attempted to speak with him and again he ignored and pretended not to be aware, but when cw mentioned that the front wooden panel beneath the sink came off earlier and if he knocked it off again it would fall on his head, he slid from beneath the sink and over beside his bed, then in a few moments decided he needed to urinate so he stood up and asked the nurse to help him go to the restroom  Upon his return he laid in bed and went to sleep  Spoke with patient's father some who reported that the patient had been having similar episodes of short term bizarre behavior intermittently since covid restrictions and he was back home at father's house  He would seem his normal self, and then disappear for a short time and next time he was seen he was exhibiting bizarre behaviors  Noted in the psychiatrist's note from the evaluation conducted a few days ago was a question of whether the patient has been abusing inhalants  Father did not know the answer  Discussed case with Dr Trudy Valdez who expressed the likelihood that the patient will remain in the ED until tomorrow when another psychiatry consult can be obtained

## 2020-07-10 NOTE — ED NOTES
Patient states that he is willing to speak to a c w  Over the phone  RN notified c w   Of guanaco Gallo RN  07/10/20 9211

## 2020-07-10 NOTE — ED NOTES
Pt walking laps around unit    Pt addressed and states he's waiting for his father and has no wish to leave unit prior to fathers arrival       Jason Lawson, [facility]  07/10/20 9549

## 2020-07-10 NOTE — ED NOTES
Pt came in yesterday with strange behaviors of running around the ed parking lot  Today pt is calm and cooperative  He is oriented x4 and denies si, hi or hallucinations  Pt denies any drug use  Pt stated he was dehydrated yesterday after running  Pt is requesting d/c home and stated his grandmother will pick him up

## 2020-07-16 ENCOUNTER — TELEPHONE (OUTPATIENT)
Dept: PSYCHIATRY | Facility: CLINIC | Age: 20
End: 2020-07-16

## 2020-07-23 ENCOUNTER — TELEPHONE (OUTPATIENT)
Dept: PSYCHIATRY | Facility: CLINIC | Age: 20
End: 2020-07-23

## 2020-08-04 ENCOUNTER — TELEPHONE (OUTPATIENT)
Dept: PSYCHIATRY | Facility: CLINIC | Age: 20
End: 2020-08-04
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
## 2021-06-23 NOTE — ED NOTES
Your Child's Health  11-14 Year Old Visit      Alessandra Dickinson  June 23, 2021    Visit Vitals  /62   Pulse 84   Temp 98.2 °F (36.8 °C) (Temporal)   Ht 5' (1.524 m)   Wt 49.4 kg   BMI 21.25 kg/m²     Weight: 108.8 lbs      YOUR CHILD'S 11 to 14 YEAR-OLD VISIT    Personal Safety  Violence in the community and schools can impact a child's physical and emotional health. Talk to your child about bullying and emphasize that it should always be reported. Work with schools and administrators to address bullying and intimidation in your child's school. Your child should know they can always call you when they are uncomfortable or concerned that they are getting into a dangerous situation. In dating situations, teens need to know it is always okay to say \"no\" to something they are uncomfortable with and that \"no\" means NO and must be respected. Sadly, youth in this age range are most likely to be targeted for exploitation (human trafficking, prostitution). Tell your child that they should never form a relationship with someone who forbids them to tell their parents about it - these are always dangerous situations.    Smoking and Other Drugs  It's always important to tell your child that you do not want them to smoke or use drugs. When there are family members, friends, or peers who are smoking, discuss with your child how strong the addiction is and how difficult it is to quit. Studies show that youth who are exposed to e-cigarettes are more likely to try them themselves, and using e-cigarettes is a risk factor for smoking regular cigarettes.    Self-Esteem & Emotional Health  A strong sense of self-esteem contributes to good mental health and can help your child succeed in many aspects of life. As your child is becoming more independent, it is still important to spend time together as a family, start discussions about lots of topics (not just about things you disagree on), listen respectfully to your child and  Call placed to [facility], made aware pt is discharged  Spoke to Teetee at [facility], states he has to call his director  Teetee made aware pt was seem ane evaluated by ER doctor        Oscar Doran RN  07/09/20 0860 answer questions honestly. Make sure you are clear about family rules and expectations regarding their dress, activities, media use, etc.    Success in school also builds self-esteem. Children who do well in school are less likely to be involved in risky behaviors. As school becomes more challenging academically, increasing demands will cause some stress that your child needs to learn to handle in a healthy way. Also, subtle learning or attention problems which may have been overlooked previously may become evident at higher grade levels. Poor school performance could also be a sign of anxiety or depression. Frequent absenteeism is a risk factor for dropping out of school; talk to your doctors or teachers if your child is missing a lot of school.    Involvement with activities that really interest your child is another way to build their self-esteem. Even if they are having some struggles with schoolwork, try to find an opportunity for them to pursue something that really interests them, like music, art, drama or sports.    Preteens and young teens can often be romero and withdrawn, and this is often normal as long as it does not last long or significantly impact their schoolwork, activities, or relationships. Depression can impact adolescents with typical symptoms being irritability, persistent boredom, poor school performance and withdrawal from activities and relationships. Young teens identifying as lesbian, pleitez, bisexual, transgender, or questioning are particularly at risk for emotional health problems; family acceptance of these young people is one of the strongest factors leading to positive outcomes for them.    Sexuality  While most youth in this age range have not had sexual experiences, your child needs to know that you do not want them to engage in sexual activity and that they can come to you with any questions about sex. Not having sex is the safest way to prevent pregnancy and avoid sexually  transmitted infections. Experimenting with drugs or alcohol will increase your child's risk of making poor decisions, which is another reason to continue to remind them that you do not want them to use drugs or alcohol. Studies show that parents have more influence on teen sexual values and decision-making than peers, media, siblings, teachers, and Spiritism teachings. But you can't ignore the topic --talk about it! Use what is going on at school, what they are seeing on TV and what is happening with friends to talk about relationships and sex. Discuss how they can respond to pressures to use drugs or engage in sexual activity. If you aren't sure how to talk to your child about sex, find resources at the American Academy of Pediatrics [email or web address].org website.     Dental Health  It is important to care for permanent teeth by brushing at least twice a day with a fluoridated toothpaste,  flossing daily and seeing a dentist regularly. Limiting carbonated sodas and other sweet beverages is also important to prevent tooth decay. Gum chewers should choose sugarless gum. Youth who are active in contact sports should wear a mouth guard. Let us know if your water supply comes from a well; fluoride supplements may be indicated.    Body Image and Healthy Lifestyle  Youth at this age can be very sensitive to how they look compared to others. The media, unfortunately, frequently presents unhealthy, unrealistic body images to youth. It's not news that a healthy body size is obtained by eating healthy foods and being physically active every day and limiting unhealthy habits like junk food and too much time spent just sitting. Encourage healthy habits in your child and remember to provide praise about all of their good aspects, not just how they look.    Make your child a partner in healthy lifestyle choices by having them help with meal planning, shopping, and food preparation at this age. Do your best to eat meals as a family  as often as you can, despite busy schedules. (And remember to keep the TV off and phones away from the table - this is one of the best times for a good family face-to-face communication.) Keep healthy choices in the home for in between meals snacks and do NOT keep a lot of junk food and unhealthy snacks in your home - if they are there, your children will eat them! Encourage lots of water to drink and avoid keeping soda and other sweet beverages in your home on a regular basis. Calcium and vitamin D remain very important for optimal bone health at this age. Your child needs 3 to 4 servings of a good source of calcium daily (low-fat or skim milk, yogurt, cheese, calcium-fortified orange juice or other foods). 600 IU of vitamin D daily is recommended. Most people cannot meet their vitamin D needs with their usual diet, so a multivitamin with iron supplement is a good way to get it. (A pure vitamin D supplement with 400 or 600 IU is also okay.)    Finding time to be physically active every day is a challenge with increasing school work and other activities. Encourage at least 60 minutes of physical activity each day. It does not have to be all at the same time. Physical education classes can count for some of it, but other activities, such as biking, dancing, and simply playing with the kids in the neighborhood are usually needed to get to the goal of 60 minutes a day. When participating in sports, make sure appropriate safety equipment is used (helmet, mouth  guard, eye protection, wrist guards, elbow and knee pads, athletic supporter). Limiting how much screen time or media use your child has is often necessary to make sure they have time for the recommended physical activity and exercise.    Sleep   Adequate sleep is important to all aspects of your child's health. Using digital devices before bedtime can interfere with quality sleep. Keeping electronic devices out of your child's room at night is a great step  towards ensuring adequate sleep and rest. If you don't already have set rules about electronic media use, check out the American Academy of Pediatrics [email or web address].org website (search for “media use plan”).    Safety On the Land and In the Water   Seatbelts do not fit properly until a child is taller than 4'9\" and weighs more than 80 pounds. Smaller kids in this age group won’t like riding in a booster seat but that is where they are safest. High-backed booster seats should be used if there are low seat backs or no head rests in your car; backless boosters can be used if your car has high seat backs and head rests. Children are safest in the back seat until they are 13 years old. Talk to your child about never getting into a car with someone who is under the influence of drugs or alcohol; let them know that they can always call you for help if this situation ever occurs.     Helmets and other protective gear should always be worn when biking, skating or skateboarding; they may be recommended for additional sports (kayaking, water polo, etc) for older children.  All-terrain vehicles (ATVs) are very dangerous and adolescents under 16 years of age should not be allowed to ride them. When on a boat or engaging in water sports, a [employer] approved lifejacket should always be worn.    Sun and Gun safety  Most teenagers love the sun, but the ultraviolet radiation of the sun causes skin damage (premature aging) and increases the risk of skin cancer. The same sun protection recommendations apply to all ages: do not spend time the sun without using sunscreen with SPF of 15 or higher, avoid prolonged exposure between 11 and 3 PM when the sun intensity is the highest, and wear sunglasses and other sun protective clothing. Use of tanning beds should not be permitted for adolescents.    If you have an adolescent with a history of depression, suicide attempts, or aggressive behaviors, it is very dangerous to keep a  firearm in your home. If firearms are stored in your home, be sure they are stored unloaded and locked with ammunition locked separately and be sure that children do not have access to the keys.    MEDICATION FOR FEVER OR PAIN:   Acetaminophen liquid (e.g., Tylenol or Tempra) may be given every four hours as needed for pain or fever. Acetaminophen liquid is less concentrated than the infant dropper bottle type. Be sure to check which product CONCENTRATION you are using.    CHILDREN’S Tylenol/Acetaminophen  (160 MG/5 mL)    Child’s Weight:  Dose:  36 - 47 pounds:    240 mg (7.5 mL (1 1/2 Teaspoons))  48 - 59 pounds:    320 mg (10.0 mL (2 Teaspoons))  60 - 71 pounds:    400 mg (12.5 mL (2 1/2 Teaspoons))  72 - 95 pounds:    480 mg (15.0 mL (3 Teaspoons))  Greater than 96 pounds:   640 mg (20.0 mL (4 Teaspoons))    CHILDREN’S Tylenol/Acetaminophen MELTAWAYS ( 80 MG tablets)    Child’s Weight:  Dose:  36 - 47 pounds:    240 mg (3 meltaway tablets)  48 - 59 pounds:    320 mg (4 meltaway tablets)  60 - 71 pounds:    400 mg (5 meltaway tablets)  72 - 95 pounds:    480 mg (6 meltaway tablets)  Greater than 96 pounds:   640 mg (8 meltaway tablets)    Gt () Tylenol/Acetaminophen MELTAWAYS (160 MG tablets)    Child’s Weight:  Dose:  36 - 47 pounds:    240 mg (1 1/2 meltaway tablets)  48 - 59 pounds:    320 mg (2 meltaway tablets)  60 - 71 pounds:    400 mg (2 1/2 meltaway tablets)  72 - 95 pounds:    480 mg (3 meltaway tablets)  Greater than 96 pounds:   640 mg (4 meltaway tablets)    CHILDREN'S Ibuprofen (e.g., Advil or Motrin) may be given every six hours as needed for pain or fever.    48 - 59 pounds:    200 mg (2 Teaspoons)  60 - 71 pounds:    250 mg (2 1/2 Teaspoons)  72 - 95 pounds:    300 mg (3 Teaspoons)        NEXT VISIT:  IN 1 YEAR    Thank you for entrusting your care to [facility]    Also, check out “Children’s Health” on the [facility] Blog for updates on timely topics regarding children’s  health!

## 2021-12-04 ENCOUNTER — ATHLETIC TRAINING (OUTPATIENT)
Dept: SPORTS MEDICINE | Facility: OTHER | Age: 21
End: 2021-12-04

## 2021-12-04 DIAGNOSIS — S46.012A STRAIN OF MUSC/TEND THE ROTATOR CUFF OF LEFT SHOULDER, INIT: Primary | ICD-10-CM

## 2021-12-17 ENCOUNTER — ATHLETIC TRAINING (OUTPATIENT)
Dept: SPORTS MEDICINE | Facility: OTHER | Age: 21
End: 2021-12-17

## 2021-12-17 DIAGNOSIS — S46.012A STRAIN OF MUSC/TEND THE ROTATOR CUFF OF LEFT SHOULDER, INIT: Primary | ICD-10-CM

## 2022-01-20 ENCOUNTER — ATHLETIC TRAINING (OUTPATIENT)
Dept: SPORTS MEDICINE | Facility: OTHER | Age: 22
End: 2022-01-20

## 2022-01-20 DIAGNOSIS — S63.696A OTHER SPRAIN OF RIGHT LITTLE FINGER, INITIAL ENCOUNTER: Primary | ICD-10-CM

## 2022-01-20 NOTE — PROGRESS NOTES
1/20/22  A: Right 5th digit on hand sprain  Pt  States that his Right 5th digit was hurt during the wrestling match on 1/15/22  O: Swelling  Compression more pain than traction  Attempted to splint and will tape for practice  Will see physician if no improvement by 1/24    LB ATC

## 2022-01-21 ENCOUNTER — ATHLETIC TRAINING (OUTPATIENT)
Dept: SPORTS MEDICINE | Facility: OTHER | Age: 22
End: 2022-01-21

## 2022-01-21 DIAGNOSIS — S63.696A OTHER SPRAIN OF RIGHT LITTLE FINGER, INITIAL ENCOUNTER: Primary | ICD-10-CM

## 2022-01-21 DIAGNOSIS — S46.912D STRAIN OF LEFT SHOULDER, SUBSEQUENT ENCOUNTER: Primary | ICD-10-CM

## 2022-01-21 NOTE — PROGRESS NOTES
1/21/22  A: Right Finger sprain  Completed compression wrap and will tape tonight before his match    TRENA DOSHI

## 2022-01-24 ENCOUNTER — ATHLETIC TRAINING (OUTPATIENT)
Dept: SPORTS MEDICINE | Facility: OTHER | Age: 22
End: 2022-01-24

## 2022-01-24 DIAGNOSIS — S46.912D STRAIN OF LEFT SHOULDER, SUBSEQUENT ENCOUNTER: Primary | ICD-10-CM

## 2022-01-25 ENCOUNTER — ATHLETIC TRAINING (OUTPATIENT)
Dept: SPORTS MEDICINE | Facility: OTHER | Age: 22
End: 2022-01-25

## 2022-01-25 DIAGNOSIS — G89.29 CHRONIC RIGHT SHOULDER PAIN: Primary | ICD-10-CM

## 2022-01-25 DIAGNOSIS — M25.511 CHRONIC RIGHT SHOULDER PAIN: Primary | ICD-10-CM

## 2022-01-25 NOTE — PROGRESS NOTES
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

## 2022-01-25 NOTE — PROGRESS NOTES
1/24/22  A: Left shoulder sprain  Pt  Discussed the ability to trust his arm in the end ranges of movement when he is about to be pinned  Worked on end ranges and passive unloading of AC joint    LB ATC

## 2022-01-26 ENCOUNTER — ATHLETIC TRAINING (OUTPATIENT)
Dept: SPORTS MEDICINE | Facility: OTHER | Age: 22
End: 2022-01-26

## 2022-01-26 DIAGNOSIS — G89.29 CHRONIC RIGHT SHOULDER PAIN: Primary | ICD-10-CM

## 2022-01-26 DIAGNOSIS — M25.511 CHRONIC RIGHT SHOULDER PAIN: Primary | ICD-10-CM

## 2022-01-26 NOTE — PROGRESS NOTES
1/26/22  A: Right Shoulder Impingement  Completed over head loading exercises  Focus on over head loading  States that this is the first time that he has been reproduce the symptoms    TRENA DOSHI

## 2022-01-27 ENCOUNTER — ATHLETIC TRAINING (OUTPATIENT)
Dept: SPORTS MEDICINE | Facility: OTHER | Age: 22
End: 2022-01-27

## 2022-01-27 DIAGNOSIS — G89.29 CHRONIC RIGHT SHOULDER PAIN: Primary | ICD-10-CM

## 2022-01-27 DIAGNOSIS — M25.511 CHRONIC RIGHT SHOULDER PAIN: Primary | ICD-10-CM

## 2022-01-27 DIAGNOSIS — S63.696A OTHER SPRAIN OF RIGHT LITTLE FINGER, INITIAL ENCOUNTER: Primary | ICD-10-CM

## 2022-01-27 NOTE — PROGRESS NOTES
1/27/22  A: Right shoulder soreness  Completed exercises and added wall engagement on Left shoulder  Soreness that occurred throughout the day is most likely related to muscle soreness    TRENA DOSHI

## 2022-01-27 NOTE — PROGRESS NOTES
1/21/22   A: Right Finger sprain  No issues during match  He would like to start working on his shoulders preventively again    LB ATC

## 2022-01-31 ENCOUNTER — ATHLETIC TRAINING (OUTPATIENT)
Dept: SPORTS MEDICINE | Facility: OTHER | Age: 22
End: 2022-01-31

## 2022-01-31 DIAGNOSIS — M25.511 CHRONIC RIGHT SHOULDER PAIN: Primary | ICD-10-CM

## 2022-01-31 DIAGNOSIS — G89.29 CHRONIC RIGHT SHOULDER PAIN: Primary | ICD-10-CM

## 2022-01-31 NOTE — PROGRESS NOTES
1/31/22  A: Right shoulder pain  Discussed nerve release techniques to attempt to decrease discomfort  Continue to work on scapular stabilization  DASH: 56 75 ADL  75 sport  DPAS: 60  No improvement from 1/21    LB ATC

## 2022-02-01 ENCOUNTER — ATHLETIC TRAINING (OUTPATIENT)
Dept: SPORTS MEDICINE | Facility: OTHER | Age: 22
End: 2022-02-01

## 2022-02-01 DIAGNOSIS — G89.29 CHRONIC RIGHT SHOULDER PAIN: Primary | ICD-10-CM

## 2022-02-01 DIAGNOSIS — M25.511 CHRONIC RIGHT SHOULDER PAIN: Primary | ICD-10-CM

## 2022-02-02 NOTE — PROGRESS NOTES
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

## 2022-02-04 ENCOUNTER — ATHLETIC TRAINING (OUTPATIENT)
Dept: SPORTS MEDICINE | Facility: OTHER | Age: 22
End: 2022-02-04

## 2022-02-04 DIAGNOSIS — M25.511 CHRONIC RIGHT SHOULDER PAIN: Primary | ICD-10-CM

## 2022-02-04 DIAGNOSIS — G89.29 CHRONIC RIGHT SHOULDER PAIN: Primary | ICD-10-CM

## 2022-02-04 NOTE — PROGRESS NOTES
2/4/22  A: Right Shoulder Pain  Pt  Was able to complete more exercises and felt that he was able to do pushup movements  Added perturbations to wall loaded position  Discussed Monday wrestling as no live or bottom drills  He was able to take care of personal things at home and felt better about that    LB ATC

## 2022-02-10 ENCOUNTER — ATHLETIC TRAINING (OUTPATIENT)
Dept: SPORTS MEDICINE | Facility: OTHER | Age: 22
End: 2022-02-10

## 2022-02-10 DIAGNOSIS — M25.511 CHRONIC RIGHT SHOULDER PAIN: Primary | ICD-10-CM

## 2022-02-10 DIAGNOSIS — G89.29 CHRONIC RIGHT SHOULDER PAIN: Primary | ICD-10-CM

## 2022-02-11 NOTE — PROGRESS NOTES
2/9/22  A: Shoulder instability  Completed wall exercises  Then started to discuss an issue he has had with bowel movements  He was refered to the ED for the issue    LB ATC

## 2022-02-17 ENCOUNTER — ATHLETIC TRAINING (OUTPATIENT)
Dept: SPORTS MEDICINE | Facility: OTHER | Age: 22
End: 2022-02-17

## 2022-02-17 DIAGNOSIS — K50.111 CROHN'S DISEASE OF LARGE INTESTINE WITH RECTAL BLEEDING (HCC): Primary | ICD-10-CM

## 2022-02-17 NOTE — PROGRESS NOTES
2/17  A: Crohn's Disease  Pt is being evaluated for Crohn's Disease and has had stomach pain for the past week  He has not had time to come in to complete rehab for his Left Shoulder  Colonoscopy 3/15    LB ATC

## 2022-02-23 ENCOUNTER — ATHLETIC TRAINING (OUTPATIENT)
Dept: SPORTS MEDICINE | Facility: OTHER | Age: 22
End: 2022-02-23

## 2022-02-23 DIAGNOSIS — S46.912D STRAIN OF LEFT SHOULDER, SUBSEQUENT ENCOUNTER: Primary | ICD-10-CM

## 2022-05-13 ENCOUNTER — ATHLETIC TRAINING (OUTPATIENT)
Dept: SPORTS MEDICINE | Facility: OTHER | Age: 22
End: 2022-05-13

## 2022-05-13 DIAGNOSIS — M25.512 CHRONIC LEFT SHOULDER PAIN: Primary | ICD-10-CM

## 2022-05-13 DIAGNOSIS — G89.29 CHRONIC LEFT SHOULDER PAIN: Primary | ICD-10-CM

## 2022-05-13 NOTE — PROGRESS NOTES
Athletic Training Progress Note    Name: Chau Hdz  Age: 20 y o  Assessment/Plan:     Visit Diagnosis: Chronic left shoulder pain [M25 [id number], G89 29]    Treatment Plan: Focus on Strengthening, progressive loading    []  Follow-up PRN  [x]  Follow-up prior to next practice/game for re-evaluation  []  Daily treatment/rehab  Progress note expected weekly  Subjective: Pt states that he would like to complete exercises for his shoulders      Objective:   See treatment log below    Treatment Log:     Date: 5/13/22       Playing Status: Limited               Exercise/Treatment        CARS 3x10       Press 3x10       Thoracic Mobilzation 3x10

## 2022-07-17 ENCOUNTER — APPOINTMENT (EMERGENCY)
Dept: RADIOLOGY | Facility: HOSPITAL | Age: 22
End: 2022-07-17
Payer: COMMERCIAL

## 2022-07-17 ENCOUNTER — HOSPITAL ENCOUNTER (EMERGENCY)
Facility: HOSPITAL | Age: 22
Discharge: HOME | End: 2022-07-17
Attending: EMERGENCY MEDICINE | Admitting: EMERGENCY MEDICINE
Payer: COMMERCIAL

## 2022-07-17 VITALS
DIASTOLIC BLOOD PRESSURE: 54 MMHG | WEIGHT: 232.59 LBS | HEART RATE: 62 BPM | TEMPERATURE: 98.1 F | BODY MASS INDEX: 35.36 KG/M2 | RESPIRATION RATE: 16 BRPM | SYSTOLIC BLOOD PRESSURE: 115 MMHG | OXYGEN SATURATION: 96 %

## 2022-07-17 DIAGNOSIS — R07.89 ATYPICAL CHEST PAIN: Primary | ICD-10-CM

## 2022-07-17 LAB
ALBUMIN SERPL BCP-MCNC: 3.8 G/DL (ref 3.5–5)
ALP SERPL-CCNC: 55 U/L (ref 46–116)
ALT SERPL W P-5'-P-CCNC: 30 U/L (ref 12–78)
ANION GAP SERPL CALCULATED.3IONS-SCNC: 9 MMOL/L (ref 4–13)
AST SERPL W P-5'-P-CCNC: 34 U/L (ref 5–45)
BASOPHILS # BLD AUTO: 0.04 THOUSANDS/ΜL (ref 0–0.1)
BASOPHILS NFR BLD AUTO: 1 % (ref 0–1)
BILIRUB SERPL-MCNC: 0.28 MG/DL (ref 0.2–1)
BUN SERPL-MCNC: 14 MG/DL (ref 5–25)
CALCIUM SERPL-MCNC: 8.3 MG/DL (ref 8.3–10.1)
CARDIAC TROPONIN I PNL SERPL HS: 3 NG/L
CHLORIDE SERPL-SCNC: 107 MMOL/L (ref 100–108)
CO2 SERPL-SCNC: 27 MMOL/L (ref 21–32)
CREAT SERPL-MCNC: 1.42 MG/DL (ref 0.6–1.3)
EOSINOPHIL # BLD AUTO: 0.42 THOUSAND/ΜL (ref 0–0.61)
EOSINOPHIL NFR BLD AUTO: 9 % (ref 0–6)
ERYTHROCYTE [DISTWIDTH] IN BLOOD BY AUTOMATED COUNT: 11.8 % (ref 11.6–15.1)
GFR SERPL CREATININE-BSD FRML MDRD: 69 ML/MIN/1.73SQ M
GLUCOSE SERPL-MCNC: 117 MG/DL (ref 65–140)
HCT VFR BLD AUTO: 39.5 % (ref 36.5–49.3)
HGB BLD-MCNC: 13.6 G/DL (ref 12–17)
IMM GRANULOCYTES # BLD AUTO: 0.02 THOUSAND/UL (ref 0–0.2)
IMM GRANULOCYTES NFR BLD AUTO: 0 % (ref 0–2)
LYMPHOCYTES # BLD AUTO: 1.25 THOUSANDS/ΜL (ref 0.6–4.47)
LYMPHOCYTES NFR BLD AUTO: 27 % (ref 14–44)
MCH RBC QN AUTO: 28.8 PG (ref 26.8–34.3)
MCHC RBC AUTO-ENTMCNC: 34.4 G/DL (ref 31.4–37.4)
MCV RBC AUTO: 84 FL (ref 82–98)
MONOCYTES # BLD AUTO: 0.53 THOUSAND/ΜL (ref 0.17–1.22)
MONOCYTES NFR BLD AUTO: 11 % (ref 4–12)
NEUTROPHILS # BLD AUTO: 2.37 THOUSANDS/ΜL (ref 1.85–7.62)
NEUTS SEG NFR BLD AUTO: 52 % (ref 43–75)
NRBC BLD AUTO-RTO: 0 /100 WBCS
PLATELET # BLD AUTO: 269 THOUSANDS/UL (ref 149–390)
PMV BLD AUTO: 9.2 FL (ref 8.9–12.7)
POTASSIUM SERPL-SCNC: 4 MMOL/L (ref 3.5–5.3)
PROT SERPL-MCNC: 6.8 G/DL (ref 6.4–8.2)
RBC # BLD AUTO: 4.73 MILLION/UL (ref 3.88–5.62)
SODIUM SERPL-SCNC: 143 MMOL/L (ref 136–145)
WBC # BLD AUTO: 4.63 THOUSAND/UL (ref 4.31–10.16)

## 2022-07-17 PROCEDURE — 71045 X-RAY EXAM CHEST 1 VIEW: CPT

## 2022-07-17 PROCEDURE — 85025 COMPLETE CBC W/AUTO DIFF WBC: CPT | Performed by: PHYSICIAN ASSISTANT

## 2022-07-17 PROCEDURE — 36415 COLL VENOUS BLD VENIPUNCTURE: CPT | Performed by: PHYSICIAN ASSISTANT

## 2022-07-17 PROCEDURE — 99285 EMERGENCY DEPT VISIT HI MDM: CPT

## 2022-07-17 PROCEDURE — 84484 ASSAY OF TROPONIN QUANT: CPT | Performed by: PHYSICIAN ASSISTANT

## 2022-07-17 PROCEDURE — 93005 ELECTROCARDIOGRAM TRACING: CPT

## 2022-07-17 PROCEDURE — 99284 EMERGENCY DEPT VISIT MOD MDM: CPT | Performed by: PHYSICIAN ASSISTANT

## 2022-07-17 PROCEDURE — 80053 COMPREHEN METABOLIC PANEL: CPT | Performed by: PHYSICIAN ASSISTANT

## 2022-07-18 LAB
ATRIAL RATE: 64 BPM
P AXIS: 57 DEGREES
PR INTERVAL: 146 MS
QRS AXIS: 90 DEGREES
QRSD INTERVAL: 86 MS
QT INTERVAL: 410 MS
QTC INTERVAL: 422 MS
T WAVE AXIS: 38 DEGREES
VENTRICULAR RATE: 64 BPM

## 2022-07-18 PROCEDURE — 93010 ELECTROCARDIOGRAM REPORT: CPT | Performed by: INTERNAL MEDICINE

## 2022-07-18 NOTE — ED PROVIDER NOTES
History  Chief Complaint   Patient presents with    Chest Pain     Patient having intermittent chest pain, sob and sweating over the last 2 days  The patient is a 20year old male who presents to the ED with the c/o chest pain  Patient states it has been on and off the last 2 days  Patient states that it occurs spontaneously last few minutes and then resolves without intervention  Patient states that there has not been anything that he has noticed that provokes the pain  Patient states that subsides spontaneously  Patient denies any falls or traumas  In recent illnesses  Patient did start a new job at [employer] where he is lifting heavily over last 3 weeks  Patient states that this is not only occur while at work but also at rest   Denies any history of CAD, diabetes, hypertension  Previously was in wrestling and was very active  Patient never had any syncope events or chest pain while exercising  Chest Pain  Pain location:  Substernal area  Pain quality: sharp and stabbing    Pain radiates to:  Does not radiate  Pain radiates to the back: no    Pain severity:  Mild  Onset quality:  Sudden  Timing:  Sporadic  Progression:  Resolved  Context: at rest    Context: no drug use    Ineffective treatments:  None tried  Associated symptoms: no abdominal pain, no altered mental status, no back pain, no cough, no headache, no nausea, no numbness, no orthopnea, no shortness of breath and no weakness    Risk factors: smoking    Risk factors: no birth control, no coronary artery disease, no high cholesterol, no hypertension, no prior DVT/PE and no surgery        Prior to Admission Medications   Prescriptions Last Dose Informant Patient Reported? Taking? cetirizine (ZyrTEC) 10 mg tablet   Yes No   Sig: Take 10 mg by mouth daily      Facility-Administered Medications: None       History reviewed  No pertinent past medical history  History reviewed  No pertinent surgical history  History reviewed   No pertinent family history  I have reviewed and agree with the history as documented  E-Cigarette/Vaping    E-Cigarette Use Never User      E-Cigarette/Vaping Substances    Nicotine No     THC No     CBD No     Flavoring No      Social History     Tobacco Use    Smoking status: Never Smoker    Smokeless tobacco: Never Used   Vaping Use    Vaping Use: Never used   Substance Use Topics    Alcohol use: Yes    Drug use: Yes     Types: Cocaine, Methamphetamines     Comment: pt denies use at this time       Review of Systems   Respiratory: Negative for cough and shortness of breath  Cardiovascular: Positive for chest pain  Negative for orthopnea  Gastrointestinal: Negative for abdominal pain and nausea  Musculoskeletal: Negative for back pain  Neurological: Negative for weakness, numbness and headaches  All other systems reviewed and are negative  Physical Exam  Physical Exam  Vitals and nursing note reviewed  Constitutional:       Appearance: He is well-developed  HENT:      Head: Normocephalic and atraumatic  Eyes:      Extraocular Movements: Extraocular movements intact  Conjunctiva/sclera: Conjunctivae normal       Pupils: Pupils are equal, round, and reactive to light  Cardiovascular:      Rate and Rhythm: Normal rate and regular rhythm  Pulses:           Carotid pulses are 2+ on the right side and 2+ on the left side  Radial pulses are 2+ on the right side and 2+ on the left side  Dorsalis pedis pulses are 2+ on the right side and 2+ on the left side  Posterior tibial pulses are 2+ on the right side and 2+ on the left side  Heart sounds: Normal heart sounds  No murmur heard  Pulmonary:      Effort: Pulmonary effort is normal  No respiratory distress  Breath sounds: Normal breath sounds  Chest:      Chest wall: No tenderness  Abdominal:      Palpations: Abdomen is soft  Tenderness: There is no abdominal tenderness  Musculoskeletal:      Cervical back: Neck supple  Right lower leg: No tenderness  No edema  Left lower leg: No tenderness  No edema  Skin:     General: Skin is warm and dry  Capillary Refill: Capillary refill takes less than 2 seconds  Neurological:      General: No focal deficit present  Mental Status: He is alert and oriented to person, place, and time           Vital Signs  ED Triage Vitals [07/17/22 2140]   Temperature Pulse Respirations Blood Pressure SpO2   98 1 °F (36 7 °C) 66 18 146/76 98 %      Temp Source Heart Rate Source Patient Position - Orthostatic VS BP Location FiO2 (%)   Temporal Monitor Lying Left arm --      Pain Score       4           Vitals:    07/17/22 2140 07/17/22 2215 07/17/22 2230   BP: 146/76 131/76 127/71   Pulse: 66 63 63   Patient Position - Orthostatic VS: Lying  Lying         Visual Acuity      ED Medications  Medications - No data to display    Diagnostic Studies  Results Reviewed     Procedure Component Value Units Date/Time    HS Troponin 0hr (reflex protocol) [[id number]]  (Normal) Collected: 07/17/22 2214    Lab Status: Final result Specimen: Blood from Arm, Right Updated: 07/17/22 2240     hs TnI 0hr 3 ng/L     Comprehensive metabolic panel [[id number]]  (Abnormal) Collected: 07/17/22 2214    Lab Status: Final result Specimen: Blood from Arm, Right Updated: 07/17/22 2233     Sodium 143 mmol/L      Potassium 4 0 mmol/L      Chloride 107 mmol/L      CO2 27 mmol/L      ANION GAP 9 mmol/L      BUN 14 mg/dL      Creatinine 1 42 mg/dL      Glucose 117 mg/dL      Calcium 8 3 mg/dL      AST 34 U/L      ALT 30 U/L      Alkaline Phosphatase 55 U/L      Total Protein 6 8 g/dL      Albumin 3 8 g/dL      Total Bilirubin 0 28 mg/dL      eGFR 69 ml/min/1 73sq m     Narrative:      [facility] guidelines for Chronic Kidney Disease (CKD):     Stage 1 with normal or high GFR (GFR > 90 mL/min/1 73 square meters)    Stage 2 Mild CKD (GFR = 60-89 mL/min/1 73 square meters)    Stage 3A Moderate CKD (GFR = 45-59 mL/min/1 73 square meters)    Stage 3B Moderate CKD (GFR = 30-44 mL/min/1 73 square meters)    Stage 4 Severe CKD (GFR = 15-29 mL/min/1 73 square meters)    Stage 5 End Stage CKD (GFR <15 mL/min/1 73 square meters)  Note: GFR calculation is accurate only with a steady state creatinine    CBC and differential [[id number]]  (Abnormal) Collected: 07/17/22 2214    Lab Status: Final result Specimen: Blood from Arm, Right Updated: 07/17/22 2218     WBC 4 63 Thousand/uL      RBC 4 73 Million/uL      Hemoglobin 13 6 g/dL      Hematocrit 39 5 %      MCV 84 fL      MCH 28 8 pg      MCHC 34 4 g/dL      RDW 11 8 %      MPV 9 2 fL      Platelets 949 Thousands/uL      nRBC 0 /100 WBCs      Neutrophils Relative 52 %      Immat GRANS % 0 %      Lymphocytes Relative 27 %      Monocytes Relative 11 %      Eosinophils Relative 9 %      Basophils Relative 1 %      Neutrophils Absolute 2 37 Thousands/µL      Immature Grans Absolute 0 02 Thousand/uL      Lymphocytes Absolute 1 25 Thousands/µL      Monocytes Absolute 0 53 Thousand/µL      Eosinophils Absolute 0 42 Thousand/µL      Basophils Absolute 0 04 Thousands/µL                  XR chest 1 view portable    (Results Pending)              Procedures  ECG 12 Lead Documentation Only    Date/Time: 7/17/2022 10:52 PM  Performed by: Ryan So PA-C  Authorized by: Ryan So PA-C     Indications / Diagnosis:  Cp  Patient location:  ED  Previous ECG:     Previous ECG:  Compared to current    Comparison ECG info:  Jul 7,2020    Similarity:  No change  Interpretation:     Interpretation: non-specific    Rate:     ECG rate:  64    ECG rate assessment: normal    Rhythm:     Rhythm: sinus rhythm    Conduction:     Conduction: normal    ST segments:     ST segments:  Normal  T waves:     T waves: normal               ED Course             HEART Risk Score    Flowsheet Row Most Recent Value   Heart Score Risk Calculator    History 0 Filed at: 07/17/2022 2254   ECG 0 Filed at: 07/17/2022 2254   Age 0 Filed at: 07/17/2022 2254   Risk Factors 1 Filed at: 07/17/2022 2254   Troponin 0 Filed at: 07/17/2022 2254   HEART Score 1 Filed at: 07/17/2022 2254                                      MDM  Number of Diagnoses or Management Options     Amount and/or Complexity of Data Reviewed  Clinical lab tests: ordered and reviewed  Tests in the radiology section of CPT®: ordered and reviewed  Decide to obtain previous medical records or to obtain history from someone other than the patient: yes  Review and summarize past medical records: yes  Independent visualization of images, tracings, or specimens: yes    Risk of Complications, Morbidity, and/or Mortality  Presenting problems: moderate  Diagnostic procedures: moderate  Management options: moderate    Patient Progress  Patient progress: stable      Disposition  Final diagnoses:   Atypical chest pain     Time reflects when diagnosis was documented in both MDM as applicable and the Disposition within this note     Time User Action Codes Description Comment    7/17/2022 10:54 PM Nikkie Hernandez Add [R07 89] Atypical chest pain       ED Disposition     ED Disposition   Discharge    Condition   Stable    Date/Time   Sun Jul 17, 2022 10:55 PM    [address] discharge to home/self care                 Follow-up Information     Follow up With Specialties Details Why [address], DO Cardiology, Internal Medicine Schedule an appointment as soon as possible for a visit   [facility] [address]  [phone]            Patient's Medications   Discharge Prescriptions    No medications on file           PDMP Review     None          ED Provider  Electronically Signed by           Lenard Johnson PA-C  07/17/22 2256

## 2022-10-28 ENCOUNTER — ATHLETIC TRAINING (OUTPATIENT)
Dept: SPORTS MEDICINE | Facility: OTHER | Age: 22
End: 2022-10-28

## 2022-10-28 DIAGNOSIS — S01.21XA LACERATION OF NOSE, INITIAL ENCOUNTER: Primary | ICD-10-CM

## 2023-03-07 ENCOUNTER — APPOINTMENT (EMERGENCY)
Dept: CT IMAGING | Facility: HOSPITAL | Age: 23
End: 2023-03-07

## 2023-03-07 ENCOUNTER — APPOINTMENT (EMERGENCY)
Dept: RADIOLOGY | Facility: HOSPITAL | Age: 23
End: 2023-03-07

## 2023-03-07 ENCOUNTER — HOSPITAL ENCOUNTER (EMERGENCY)
Facility: HOSPITAL | Age: 23
Discharge: HOME | End: 2023-03-07
Attending: EMERGENCY MEDICINE | Admitting: EMERGENCY MEDICINE

## 2023-03-07 VITALS
TEMPERATURE: 97.8 F | BODY MASS INDEX: 32.31 KG/M2 | DIASTOLIC BLOOD PRESSURE: 90 MMHG | SYSTOLIC BLOOD PRESSURE: 134 MMHG | HEART RATE: 65 BPM | OXYGEN SATURATION: 98 % | WEIGHT: 213.19 LBS | RESPIRATION RATE: 17 BRPM | HEIGHT: 68 IN

## 2023-03-07 DIAGNOSIS — R51.9 HEADACHE: ICD-10-CM

## 2023-03-07 DIAGNOSIS — R20.2 PARESTHESIAS: Primary | ICD-10-CM

## 2023-03-07 LAB
ANION GAP SERPL CALCULATED.3IONS-SCNC: 4 MMOL/L (ref 4–13)
BASOPHILS # BLD AUTO: 0.04 THOUSANDS/ÂΜL (ref 0–0.1)
BASOPHILS NFR BLD AUTO: 1 % (ref 0–1)
BUN SERPL-MCNC: 16 MG/DL (ref 5–25)
CALCIUM SERPL-MCNC: 9.2 MG/DL (ref 8.4–10.2)
CHLORIDE SERPL-SCNC: 105 MMOL/L (ref 96–108)
CK MB SERPL-MCNC: 1.4 % (ref 0–2.5)
CK MB SERPL-MCNC: 2.8 NG/ML (ref 0.6–6.3)
CK SERPL-CCNC: 207 U/L (ref 39–308)
CO2 SERPL-SCNC: 31 MMOL/L (ref 21–32)
CREAT SERPL-MCNC: 1.01 MG/DL (ref 0.6–1.3)
EOSINOPHIL # BLD AUTO: 0.44 THOUSAND/ÂΜL (ref 0–0.61)
EOSINOPHIL NFR BLD AUTO: 8 % (ref 0–6)
ERYTHROCYTE [DISTWIDTH] IN BLOOD BY AUTOMATED COUNT: 11.6 % (ref 11.6–15.1)
GFR SERPL CREATININE-BSD FRML MDRD: 103 ML/MIN/1.73SQ M
GLUCOSE SERPL-MCNC: 90 MG/DL (ref 65–140)
HCT VFR BLD AUTO: 41.1 % (ref 36.5–49.3)
HGB BLD-MCNC: 13.9 G/DL (ref 12–17)
IMM GRANULOCYTES # BLD AUTO: 0.01 THOUSAND/UL (ref 0–0.2)
IMM GRANULOCYTES NFR BLD AUTO: 0 % (ref 0–2)
LYMPHOCYTES # BLD AUTO: 1.16 THOUSANDS/ÂΜL (ref 0.6–4.47)
LYMPHOCYTES NFR BLD AUTO: 20 % (ref 14–44)
MAGNESIUM SERPL-MCNC: 2.1 MG/DL (ref 1.9–2.7)
MCH RBC QN AUTO: 28.7 PG (ref 26.8–34.3)
MCHC RBC AUTO-ENTMCNC: 33.8 G/DL (ref 31.4–37.4)
MCV RBC AUTO: 85 FL (ref 82–98)
MONOCYTES # BLD AUTO: 0.48 THOUSAND/ÂΜL (ref 0.17–1.22)
MONOCYTES NFR BLD AUTO: 8 % (ref 4–12)
NEUTROPHILS # BLD AUTO: 3.72 THOUSANDS/ÂΜL (ref 1.85–7.62)
NEUTS SEG NFR BLD AUTO: 63 % (ref 43–75)
NRBC BLD AUTO-RTO: 0 /100 WBCS
PLATELET # BLD AUTO: 273 THOUSANDS/UL (ref 149–390)
PMV BLD AUTO: 9.1 FL (ref 8.9–12.7)
POTASSIUM SERPL-SCNC: 4 MMOL/L (ref 3.5–5.3)
RBC # BLD AUTO: 4.85 MILLION/UL (ref 3.88–5.62)
SODIUM SERPL-SCNC: 140 MMOL/L (ref 135–147)
WBC # BLD AUTO: 5.85 THOUSAND/UL (ref 4.31–10.16)

## 2023-03-07 NOTE — DISCHARGE INSTRUCTIONS
Return to the ER immediately for any worsening symptoms  Follow up with your doctor/neurologist for further evaluation and management

## 2023-03-07 NOTE — ED PROVIDER NOTES
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 Taking? cetirizine (ZyrTEC) 10 mg tablet Not Taking  Yes No   Sig: Take 10 mg by mouth daily   Patient not taking: Reported on 3/7/2023      Facility-Administered Medications: None       History reviewed  No pertinent past medical history  History reviewed  No pertinent surgical history  History reviewed  No pertinent family history  I have reviewed and agree with the history as documented  E-Cigarette/Vaping   • E-Cigarette Use Current Every Day User      E-Cigarette/Vaping Substances   • Nicotine Yes    • THC No    • CBD No    • Flavoring Yes      Social History     Tobacco Use   • Smoking status: Never   • Smokeless tobacco: Never   Vaping Use   • Vaping Use: Every day   • Substances: Nicotine, Flavoring   Substance Use Topics   • Alcohol use: Yes   • Drug use: Not Currently     Types: Cocaine, Methamphetamines     Comment: pt denies use at this time       Review of Systems   Constitutional: Negative for chills and fever  HENT: Negative for ear pain and sore throat  Eyes: Negative for pain and visual disturbance  Respiratory: Negative for cough and shortness of breath  Cardiovascular: Negative for chest pain and palpitations  Gastrointestinal: Negative for abdominal pain and vomiting  Genitourinary: Negative for dysuria and hematuria  Musculoskeletal: Positive for back pain  Negative for arthralgias and gait problem  Skin: Negative for color change and rash  Neurological: Negative  Negative for seizures and syncope  All other systems reviewed and are negative  Physical Exam  Physical Exam  Vitals and nursing note reviewed  Constitutional:       General: He is not in acute distress  Appearance: Normal appearance  He is well-developed and normal weight  He is not ill-appearing, toxic-appearing or diaphoretic  HENT:      Head: Normocephalic and atraumatic  Nose: Nose normal       Mouth/Throat:      Mouth: Mucous membranes are moist    Eyes:      Extraocular Movements: Extraocular movements intact  Conjunctiva/sclera: Conjunctivae normal    Cardiovascular:      Rate and Rhythm: Normal rate and regular rhythm  Pulses: Normal pulses  Heart sounds: Normal heart sounds  No murmur heard  Pulmonary:      Effort: Pulmonary effort is normal  No respiratory distress  Breath sounds: Normal breath sounds  No stridor  No wheezing, rhonchi or rales  Chest:      Chest wall: No tenderness  Abdominal:      General: Abdomen is flat  Bowel sounds are normal  There is no distension  Palpations: Abdomen is soft  There is no mass  Tenderness: There is no abdominal tenderness  There is no guarding or rebound  Hernia: No hernia is present  Musculoskeletal:         General: No swelling, tenderness, deformity or signs of injury  Normal range of motion  Cervical back: Normal range of motion and neck supple  Right lower leg: No edema  Left lower leg: No edema  Skin:     General: Skin is warm and dry  Capillary Refill: Capillary refill takes less than 2 seconds  Neurological:      General: No focal deficit present  Mental Status: He is alert and oriented to person, place, and time  Mental status is at baseline  Cranial Nerves: No cranial nerve deficit  Sensory: No sensory deficit  Motor: No weakness        Coordination: Coordination normal       Gait: Gait normal       Deep Tendon Reflexes: Reflexes normal    Psychiatric:         Mood and Affect: Mood normal          Behavior: Behavior normal          Vital Signs  ED Triage Vitals [03/07/23 1613]   Temperature Pulse Respirations Blood Pressure SpO2   97 8 °F (36 6 °C) 66 17 141/73 98 %      Temp Source Heart Rate Source Patient Position - Orthostatic VS BP Location FiO2 (%)   Temporal Monitor Lying Left arm --      Pain Score       3           Vitals:    03/07/23 1613 03/07/23 1630 03/07/23 1700   BP: 141/73 113/61 134/90   Pulse: 66 61 65   Patient Position - Orthostatic VS: Lying           Visual Acuity      ED Medications  Medications - No data to display    Diagnostic Studies  Results Reviewed     Procedure Component Value Units Date/Time    CKMB [[id number]]  (Normal) Collected: 03/07/23 1724    Lab Status: Final result Specimen: Blood from Arm, Right Updated: 03/07/23 1807     CK-MB Index 1 4 %      CK-MB 2 8 ng/mL     Basic metabolic panel [[id number]] Collected: 03/07/23 1724    Lab Status: Final result Specimen: Blood from Arm, Right Updated: 03/07/23 1747     Sodium 140 mmol/L      Potassium 4 0 mmol/L      Chloride 105 mmol/L      CO2 31 mmol/L      ANION GAP 4 mmol/L      BUN 16 mg/dL      Creatinine 1 01 mg/dL      Glucose 90 mg/dL      Calcium 9 2 mg/dL      eGFR 103 ml/min/1 73sq m     Narrative:      [place] guidelines for Chronic Kidney Disease (CKD):   •  Stage 1 with normal or high GFR (GFR > 90 mL/min/1 73 square meters)  •  Stage 2 Mild CKD (GFR = 60-89 mL/min/1 73 square meters)  •  Stage 3A Moderate CKD (GFR = 45-59 mL/min/1 73 square meters)  •  Stage 3B Moderate CKD (GFR = 30-44 mL/min/1 73 square meters)  • Stage 4 Severe CKD (GFR = 15-29 mL/min/1 73 square meters)  •  Stage 5 End Stage CKD (GFR <15 mL/min/1 73 square meters)  Note: GFR calculation is accurate only with a steady state creatinine    Magnesium [[id number]]  (Normal) Collected: 03/07/23 1724    Lab Status: Final result Specimen: Blood from Arm, Right Updated: 03/07/23 1747     Magnesium 2 1 mg/dL     CK (with reflex to MB) [[id number]]  (Normal) Collected: 03/07/23 1724    Lab Status: Final result Specimen: Blood from Arm, Right Updated: 03/07/23 1747     Total  U/L     CBC and differential [[id number]]  (Abnormal) Collected: 03/07/23 1724    Lab Status: Final result Specimen: Blood from Arm, Right Updated: 03/07/23 1729     WBC 5 85 Thousand/uL      RBC 4 85 Million/uL      Hemoglobin 13 9 g/dL      Hematocrit 41 1 %      MCV 85 fL      MCH 28 7 pg      MCHC 33 8 g/dL      RDW 11 6 %      MPV 9 1 fL      Platelets 535 Thousands/uL      nRBC 0 /100 WBCs      Neutrophils Relative 63 %      Immat GRANS % 0 %      Lymphocytes Relative 20 %      Monocytes Relative 8 %      Eosinophils Relative 8 %      Basophils Relative 1 %      Neutrophils Absolute 3 72 Thousands/µL      Immature Grans Absolute 0 01 Thousand/uL      Lymphocytes Absolute 1 16 Thousands/µL      Monocytes Absolute 0 48 Thousand/µL      Eosinophils Absolute 0 44 Thousand/µL      Basophils Absolute 0 04 Thousands/µL                  CT head without contrast   Final Result by Mariya Capps MD (03/07 1825)      No acute intracranial abnormality  Workstation performed: [id number]         XR spine cervical 2 or 3 vw injury   ED Interpretation by Reyna Johns DO (03/07 1831)   No acute findings      XR spine lumbar 2 or 3 views injury   ED Interpretation by Reyna Johns DO (03/07 1831)   No acute findings                 Procedures  Procedures         ED Course         Patient had a stable ED course no acute findings on his diagnostic studies or labs   I discussed this with the patient and the need for outpatient follow up  It is likely that given the patients wrestling history, the patient more than likely has radiculopathy  However he is still encouraged to follow up outpatient  He is stable for discharge  Medical Decision Making  DDx: Radiculopathy, spinal stenosis, electrolyte abnormality, Complex migraine, neoplasm    Amount and/or Complexity of Data Reviewed  Labs: ordered  Radiology: ordered and independent interpretation performed  Disposition  Final diagnoses:   Paresthesias   Headache     Time reflects when diagnosis was documented in both MDM as applicable and the Disposition within this note     Time User Action Codes Description Comment    3/7/2023  6:31 PM Jackqueline Rober Add [R20 2] Paresthesias     3/7/2023  6:32 PM Jackqueline Rober Add [R51 9] Headache       ED Disposition     ED Disposition   Discharge    Condition   Stable    Date/Time   Tue Mar 7, 2023  6:31 PM    [address] discharge to home/self care  Follow-up Information     Follow up With Specialties Details Why Marilynn 62, DO Neurology In 1 week  [facility] [address] [facility]  [phone]            Discharge Medication List as of 3/7/2023  6:35 PM      CONTINUE these medications which have NOT CHANGED    Details   cetirizine (ZyrTEC) 10 mg tablet Take 10 mg by mouth daily, Historical Med             No discharge procedures on file      PDMP Review     None          ED Provider  Electronically Signed by           Chastity Coppola DO  03/07/23 2145       Chastity Coppola DO  03/07/23 2146

## 2023-04-05 ENCOUNTER — HOSPITAL ENCOUNTER (EMERGENCY)
Facility: HOSPITAL | Age: 23
Discharge: HOME | End: 2023-04-05
Attending: EMERGENCY MEDICINE

## 2023-04-05 VITALS
SYSTOLIC BLOOD PRESSURE: 146 MMHG | RESPIRATION RATE: 16 BRPM | DIASTOLIC BLOOD PRESSURE: 90 MMHG | OXYGEN SATURATION: 99 % | HEART RATE: 85 BPM | BODY MASS INDEX: 32.69 KG/M2 | TEMPERATURE: 98.2 F | WEIGHT: 215 LBS

## 2023-04-05 DIAGNOSIS — H60.01 ABSCESS OF RIGHT EXTERNAL EAR: Primary | ICD-10-CM

## 2023-04-05 RX ORDER — LEVOFLOXACIN 500 MG/1
500 TABLET, FILM COATED ORAL ONCE
Status: COMPLETED | OUTPATIENT
Start: 2023-04-05 | End: 2023-04-05

## 2023-04-05 RX ORDER — METHYLPREDNISOLONE 4 MG/1
TABLET ORAL
Qty: 21 TABLET | Refills: 0 | Status: SHIPPED | OUTPATIENT
Start: 2023-04-05

## 2023-04-05 RX ORDER — LEVOFLOXACIN 500 MG/1
500 TABLET, FILM COATED ORAL DAILY
Qty: 7 TABLET | Refills: 0 | Status: SHIPPED | OUTPATIENT
Start: 2023-04-05 | End: 2023-04-12

## 2023-04-05 RX ADMIN — LEVOFLOXACIN 500 MG: 500 TABLET, FILM COATED ORAL at 19:41

## 2023-04-05 NOTE — DISCHARGE INSTRUCTIONS
Please call Dr Efrain Franklin first thing in AM, he will see you in the office tomorrow  Please begin taking the prescribed medications

## 2023-04-05 NOTE — ED PROVIDER NOTES
"History  Chief Complaint   Patient presents with   • Facial Swelling     Had a scab on his right ear, then next day stated it was draining pus and today his \"ear is swollen and filled with blood again  \" Right ear notably swollen     Patient states 2 days ago he scratched his right ear and since then, the superior portion of the cartilage has been swollen and red and painful since that time  No drainage from the ear itself  He complains of pain radiating down into the neck  No fevers or chills  No other complaints  History provided by:  Patient   used: No    Earache  Location:  Right  Behind ear:  Redness  Quality:  Aching  Severity:  Moderate  Onset quality:  Gradual  Duration:  2 days  Timing:  Constant  Progression:  Unchanged  Chronicity:  New  Relieved by:  Nothing  Worsened by:  Nothing  Ineffective treatments:  None tried  Associated symptoms: no abdominal pain, no cough, no diarrhea, no ear discharge, no fever, no headaches, no hearing loss, no neck pain, no rash, no rhinorrhea, no sore throat and no vomiting        Prior to Admission Medications   Prescriptions Last Dose Informant Patient Reported? Taking? cetirizine (ZyrTEC) 10 mg tablet   Yes No   Sig: Take 10 mg by mouth daily   Patient not taking: Reported on 3/7/2023      Facility-Administered Medications: None       History reviewed  No pertinent past medical history  History reviewed  No pertinent surgical history  History reviewed  No pertinent family history  I have reviewed and agree with the history as documented  E-Cigarette/Vaping   • E-Cigarette Use Current Every Day User      E-Cigarette/Vaping Substances   • Nicotine Yes    • THC No    • CBD No    • Flavoring Yes      Social History     Tobacco Use   • Smoking status: Never   • Smokeless tobacco: Never   Vaping Use   • Vaping Use: Every day   • Substances: Nicotine, Flavoring   Substance Use Topics   • Alcohol use:  Yes   • Drug use: Not Currently     " Types: Cocaine, Methamphetamines     Comment: pt denies use at this time       Review of Systems   Constitutional: Negative for chills and fever  HENT: Positive for ear pain  Negative for ear discharge, hearing loss, rhinorrhea, sore throat, trouble swallowing and voice change  Eyes: Negative for pain and discharge  Respiratory: Negative for cough, shortness of breath and wheezing  Cardiovascular: Negative for chest pain and palpitations  Gastrointestinal: Negative for abdominal pain, blood in stool, constipation, diarrhea, nausea and vomiting  Genitourinary: Negative for dysuria, flank pain, frequency and hematuria  Musculoskeletal: Negative for joint swelling, neck pain and neck stiffness  Skin: Negative for rash and wound  Neurological: Negative for dizziness, seizures, syncope, facial asymmetry and headaches  Psychiatric/Behavioral: Negative for hallucinations, self-injury and suicidal ideas  All other systems reviewed and are negative  Physical Exam  Physical Exam  Vitals and nursing note reviewed  Constitutional:       General: He is not in acute distress  Appearance: Normal appearance  He is well-developed  He is not ill-appearing or diaphoretic  HENT:      Head: Normocephalic and atraumatic  Left Ear: External ear normal       Ears:      Comments: Right ear has changes of cauliflower ear  Patient also has significant redness, warmth, swelling of the superior portion of the external ear involving the entire cartilage  The area is mildly tender to palpation  There is mild fluctuance  It is consistent with abscess/hematoma  The inferior portion of the ear is normal   The external auditory canal is normal   The tympanic membrane has chronic scarring changes but no acute abnormality otherwise  Eyes:      General: No scleral icterus  Right eye: No discharge  Left eye: No discharge  Extraocular Movements: Extraocular movements intact  Conjunctiva/sclera: Conjunctivae normal    Pulmonary:      Effort: Pulmonary effort is normal  No respiratory distress  Musculoskeletal:         General: No swelling or deformity  Normal range of motion  Cervical back: Normal range of motion and neck supple  Skin:     General: Skin is dry  Coloration: Skin is not jaundiced or pale  Findings: No rash  Neurological:      General: No focal deficit present  Mental Status: He is alert and oriented to person, place, and time  Cranial Nerves: No cranial nerve deficit  Motor: No weakness  Coordination: Coordination normal       Gait: Gait normal    Psychiatric:         Mood and Affect: Mood normal          Behavior: Behavior normal          Thought Content: Thought content normal          Judgment: Judgment normal          Vital Signs  ED Triage Vitals [04/05/23 1850]   Temperature Pulse Respirations Blood Pressure SpO2   98 2 °F (36 8 °C) 85 16 146/90 99 %      Temp Source Heart Rate Source Patient Position - Orthostatic VS BP Location FiO2 (%)   Temporal Monitor -- -- --      Pain Score       7           Vitals:    04/05/23 1850   BP: 146/90   Pulse: 85         Visual Acuity      ED Medications  Medications   levofloxacin (LEVAQUIN) tablet 500 mg (has no administration in time range)       Diagnostic Studies  Results Reviewed     None                 No orders to display              Procedures  Procedures         ED Course  ED Course as of 04/05/23 1938 Wed Apr 05, 2023 1936 Discussed with ENT on-call, Dr Tim Brasher, recommends Levaquin and Medrol Dosepak and will see patient in office tomorrow                                             Medical Decision Making  Based on the history and medical screening exam performed the differential diagnosis includes but is not limited to abscess of ear, hematoma of ear, cellulitis of ear       Based on the work-up performed in the emergency room which includes physical examination, and which may include laboratory studies and imaging as warranted including advanced imaging such as CT scan or ultrasound, the differential diagnosis is narrowed to exclude limb or life-threatening process  The patient is stable for discharge  Discussed with on-call ENT who states he will see the patient in the office tomorrow, recommends that patient be started on Levaquin and Medrol Dosepak  First dose of Levaquin given in the ED  Amount and/or Complexity of Data Reviewed  Labs:      Details: Not indicated  Radiology:      Details: Not indicated  Discussion of management or test interpretation with external provider(s): ENT on-call    Risk  Prescription drug management  Disposition  Final diagnoses:   Abscess of right external ear     Time reflects when diagnosis was documented in both MDM as applicable and the Disposition within this note     Time User Action Codes Description Comment    4/5/2023  7:34 PM David Henry [H60 01] Abscess of right external ear       ED Disposition     ED Disposition   Discharge    Condition   Stable    Date/Time   Wed Apr 5, 2023  7:34 PM    Comment   Jay Jay Barroso discharge to home/self care                 Follow-up Information     Follow up With Specialties Details Why [address], [place] [address]      Kayce Nunez MD Otolaryngology   [address]  [address]  [facility]   [phone]            Patient's Medications   Discharge Prescriptions    LEVOFLOXACIN (LEVAQUIN) 500 MG TABLET    Take 1 tablet (500 mg total) by mouth daily for 7 days       Start Date: 4/5/2023  End Date: 4/12/2023       Order Dose: 500 mg       Quantity: 7 tablet    Refills: 0    METHYLPREDNISOLONE 4 MG TABLET THERAPY PACK    Use as directed on package       Start Date: 4/5/2023  End Date: --       Order Dose: --       Quantity: 21 tablet    Refills: 0       No discharge procedures on file      PDMP Review     None          ED Provider  Electronically Signed by           Vega Bernal MD  04/05/23 8302